# Patient Record
Sex: FEMALE | Race: WHITE | Employment: OTHER | URBAN - NONMETROPOLITAN AREA
[De-identification: names, ages, dates, MRNs, and addresses within clinical notes are randomized per-mention and may not be internally consistent; named-entity substitution may affect disease eponyms.]

---

## 2017-09-25 ENCOUNTER — HOSPITAL ENCOUNTER (OUTPATIENT)
Age: 57
Discharge: HOME | End: 2017-09-25
Payer: COMMERCIAL

## 2017-09-25 ENCOUNTER — HOSPITAL ENCOUNTER (OUTPATIENT)
Dept: WOMENS IMAGING | Age: 57
Discharge: HOME | End: 2017-09-25
Payer: COMMERCIAL

## 2017-09-25 ENCOUNTER — HOSPITAL ENCOUNTER (OUTPATIENT)
Dept: GENERAL RADIOLOGY | Age: 57
Discharge: HOME | End: 2017-09-25
Payer: COMMERCIAL

## 2017-09-25 DIAGNOSIS — Z12.31 VISIT FOR SCREENING MAMMOGRAM: ICD-10-CM

## 2017-09-25 DIAGNOSIS — M54.5 LOW BACK PAIN, UNSPECIFIED BACK PAIN LATERALITY, UNSPECIFIED CHRONICITY, WITH SCIATICA PRESENCE UNSPECIFIED: ICD-10-CM

## 2017-09-25 PROCEDURE — 77063 BREAST TOMOSYNTHESIS BI: CPT

## 2017-09-25 PROCEDURE — 72100 X-RAY EXAM L-S SPINE 2/3 VWS: CPT

## 2017-10-16 ENCOUNTER — HOSPITAL ENCOUNTER (OUTPATIENT)
Age: 57
Setting detail: OUTPATIENT SURGERY
Discharge: HOME HEALTH | End: 2017-10-16
Attending: INTERNAL MEDICINE | Admitting: INTERNAL MEDICINE
Payer: COMMERCIAL

## 2017-10-16 ENCOUNTER — ANESTHESIA (OUTPATIENT)
Dept: ENDOSCOPY | Age: 57
End: 2017-10-16
Payer: COMMERCIAL

## 2017-10-16 ENCOUNTER — ANESTHESIA EVENT (OUTPATIENT)
Dept: ENDOSCOPY | Age: 57
End: 2017-10-16
Payer: COMMERCIAL

## 2017-10-16 VITALS
OXYGEN SATURATION: 100 % | RESPIRATION RATE: 11 BRPM | SYSTOLIC BLOOD PRESSURE: 94 MMHG | DIASTOLIC BLOOD PRESSURE: 67 MMHG

## 2017-10-16 VITALS
HEIGHT: 61 IN | SYSTOLIC BLOOD PRESSURE: 113 MMHG | TEMPERATURE: 97 F | DIASTOLIC BLOOD PRESSURE: 77 MMHG | HEART RATE: 66 BPM | RESPIRATION RATE: 16 BRPM | WEIGHT: 117.2 LBS | BODY MASS INDEX: 22.13 KG/M2 | OXYGEN SATURATION: 100 %

## 2017-10-16 PROCEDURE — 3700000000 HC ANESTHESIA ATTENDED CARE: Performed by: INTERNAL MEDICINE

## 2017-10-16 PROCEDURE — 3609012400 HC EGD TRANSORAL BIOPSY SINGLE/MULTIPLE: Performed by: INTERNAL MEDICINE

## 2017-10-16 PROCEDURE — 6360000002 HC RX W HCPCS: Performed by: NURSE ANESTHETIST, CERTIFIED REGISTERED

## 2017-10-16 PROCEDURE — 7100000001 HC PACU RECOVERY - ADDTL 15 MIN: Performed by: INTERNAL MEDICINE

## 2017-10-16 PROCEDURE — 2500000003 HC RX 250 WO HCPCS: Performed by: NURSE ANESTHETIST, CERTIFIED REGISTERED

## 2017-10-16 PROCEDURE — 88305 TISSUE EXAM BY PATHOLOGIST: CPT

## 2017-10-16 PROCEDURE — 2580000003 HC RX 258: Performed by: INTERNAL MEDICINE

## 2017-10-16 PROCEDURE — 3700000001 HC ADD 15 MINUTES (ANESTHESIA): Performed by: INTERNAL MEDICINE

## 2017-10-16 PROCEDURE — 7100000000 HC PACU RECOVERY - FIRST 15 MIN: Performed by: INTERNAL MEDICINE

## 2017-10-16 PROCEDURE — 2580000003 HC RX 258: Performed by: NURSE ANESTHETIST, CERTIFIED REGISTERED

## 2017-10-16 PROCEDURE — 3609027000 HC COLONOSCOPY: Performed by: INTERNAL MEDICINE

## 2017-10-16 RX ORDER — PROPOFOL 10 MG/ML
INJECTION, EMULSION INTRAVENOUS PRN
Status: DISCONTINUED | OUTPATIENT
Start: 2017-10-16 | End: 2017-10-16 | Stop reason: SDUPTHER

## 2017-10-16 RX ORDER — LIDOCAINE HYDROCHLORIDE 20 MG/ML
INJECTION, SOLUTION INFILTRATION; PERINEURAL PRN
Status: DISCONTINUED | OUTPATIENT
Start: 2017-10-16 | End: 2017-10-16 | Stop reason: SDUPTHER

## 2017-10-16 RX ORDER — SODIUM CHLORIDE 9 MG/ML
INJECTION, SOLUTION INTRAVENOUS CONTINUOUS PRN
Status: DISCONTINUED | OUTPATIENT
Start: 2017-10-16 | End: 2017-10-16 | Stop reason: SDUPTHER

## 2017-10-16 RX ORDER — SODIUM CHLORIDE 450 MG/100ML
INJECTION, SOLUTION INTRAVENOUS CONTINUOUS
Status: DISCONTINUED | OUTPATIENT
Start: 2017-10-16 | End: 2017-10-16 | Stop reason: HOSPADM

## 2017-10-16 RX ORDER — ZOLPIDEM TARTRATE 10 MG/1
10 TABLET ORAL NIGHTLY PRN
COMMUNITY

## 2017-10-16 RX ADMIN — PROPOFOL 50 MG: 10 INJECTION, EMULSION INTRAVENOUS at 07:56

## 2017-10-16 RX ADMIN — SODIUM CHLORIDE: 4.5 INJECTION, SOLUTION INTRAVENOUS at 07:23

## 2017-10-16 RX ADMIN — LIDOCAINE HYDROCHLORIDE 100 MG: 20 INJECTION, SOLUTION INFILTRATION; PERINEURAL at 07:49

## 2017-10-16 RX ADMIN — PROPOFOL 20 MG: 10 INJECTION, EMULSION INTRAVENOUS at 08:15

## 2017-10-16 RX ADMIN — PROPOFOL 20 MG: 10 INJECTION, EMULSION INTRAVENOUS at 08:05

## 2017-10-16 RX ADMIN — PROPOFOL 50 MG: 10 INJECTION, EMULSION INTRAVENOUS at 07:59

## 2017-10-16 RX ADMIN — PROPOFOL 30 MG: 10 INJECTION, EMULSION INTRAVENOUS at 08:02

## 2017-10-16 RX ADMIN — SODIUM CHLORIDE: 9 INJECTION, SOLUTION INTRAVENOUS at 07:48

## 2017-10-16 RX ADMIN — PROPOFOL 80 MG: 10 INJECTION, EMULSION INTRAVENOUS at 07:49

## 2017-10-16 RX ADMIN — PROPOFOL 30 MG: 10 INJECTION, EMULSION INTRAVENOUS at 08:10

## 2017-10-16 RX ADMIN — PROPOFOL 20 MG: 10 INJECTION, EMULSION INTRAVENOUS at 07:53

## 2017-10-16 ASSESSMENT — PAIN SCALES - GENERAL
PAINLEVEL_OUTOF10: 0
PAINLEVEL_OUTOF10: 0

## 2017-10-16 ASSESSMENT — PAIN - FUNCTIONAL ASSESSMENT: PAIN_FUNCTIONAL_ASSESSMENT: 0-10

## 2017-10-16 NOTE — BRIEF OP NOTE
Brief Postoperative Note  ______________________________________________________________    Patient: Kathleen Johnson  YOB: 1960  MRN: [id number]  Date of Procedure: 10/16/2017    Pre-Op Diagnosis: GERD, DYSPHAGIA, DIARRHEA, FAMILY HX OF COLON CA    Post-Op Diagnosis: Same       Procedure(s):  COLONOSCOPY  EGD  EGD BIOPSY    Anesthesia: Monitor Anesthesia Care    Surgeon(s):  Rachel Johnson MD    Staff:  Scrub Person First: Lesa Sheth     Estimated Blood Loss: * No values recorded between 10/16/2017  7:24 AM and 10/16/2017  1:15 AM *    Complications: None    Specimens:   ID Type Source Tests Collected by Time Destination   A :  Tissue Stomach SURGICAL PATHOLOGY Rachel Johnson MD 10/16/2017 0754    B :  Tissue Esophagus SURGICAL PATHOLOGY Rachel Johnson MD 10/16/2017 0756        Implants:  * No implants in log *      Drains:      Findings: gastric polyp bx, colon normal    Rachel Johnson MD  Date: 10/16/2017  Time: 8:30 AM

## 2017-10-16 NOTE — H&P
[address] [place] [address]                      PREOPERATIVE HISTORY AND PHYSICAL    PATIENT NAME: Reza Fisher                     :             1960  MED REC NO:   [id number]                            ROOM:  ACCOUNT NO:   [de-identified]                            ADMISSION DATE:  PROVIDER:     Amita Landry M.D. INDICATION:  Chest pain. HISTORY OF PRESENT ILLNESS:  The patient is a 24-year-old pleasant  female for further evaluation of chest pain. EGD was in ,  reported as esophageal scarring status post dilation, had chest pain  that she noticed first thing in the morning. She has burning  sensation in the esophagus, taking omeprazole helped some, has  difficulty swallowing and tightness in the throat. Nothing makes it  better. No constipation. One bowel movement daily, but may have  diarrhea, no blood in the stool. Family history of colon cancer in  the father and she is undergoing further evaluation. PAST MEDICAL HISTORY:  Migraine headaches. PAST SURGICAL HISTORY:  Left breast lumpectomy,  section x3,  cosmetic surgery, endometrial ablation. MEDICATIONS:  Reviewed. PHYSICAL EXAMINATION:  VITALS:  Temperature 98.4, pulse 82, respiratory rate 16, blood  pressure 142/87. HEART:  Regular. Normal S1 and S2. No S3.  LUNGS:  Equal to expansion on inspection. ABDOMEN:  Soft, normoactive bowel sounds, nontender, nondistended. IMPRESSION:  A 24-year-old pleasant female who has epigastric and  substernal chest pain. The patient had diarrhea, family history of  colon cancer. She is undergoing further evaluation. RECOMMENDATIONS:  Keep the patient nothing by mouth. Proceed with EGD  and colonoscopy as planned.   The risks including but not limited to  perforation, bleeding, infection, adverse reaction to medicine, very  slight chance of missing significant lesions, discussed with the  patient. The patient expressed her understanding of the procedure and  agreed to proceed with EGD. Further plans pending the above test  results.         Jensen Duvall M.D.    D: 10/16/2017 7:48:02       T: 10/16/2017 8:50:45     [email or web address]  Job#: [id number]     Doc#: [id number]

## 2017-10-16 NOTE — ANESTHESIA POSTPROCEDURE EVALUATION
Department of Anesthesiology  Postprocedure Note    Patient: Torsten Segura  MRN: [id number]  YOB: 1960  Date of evaluation: 10/16/2017  Time:  8:22 AM     Procedure Summary     Date:  10/16/17 Room / Location:  [facility] / [facility]    Anesthesia Start:  0748 [facility]    Procedures:       COLONOSCOPY (N/A Anus)      EGD (N/A )      EGD BIOPSY (Left Esophagus) Diagnosis:  (GERD, DYSPHAGIA, DIARRHEA, FAMILY HX OF COLON CA)    Surgeon:  Bunny Shaikh MD Responsible Provider:  Christie Scott MD    Anesthesia Type:  MAC ASA Status:  2          Anesthesia Type: MAC    Corie Phase I: Corie Score: 10    Corie Phase II:      Last vitals: Reviewed and per EMR flowsheets.        Anesthesia Post Evaluation    Patient location during evaluation: PACU  Patient participation: complete - patient participated  Level of consciousness: awake and alert  Pain score: 0  Airway patency: patent  Nausea & Vomiting: no nausea and no vomiting  Complications: no  Cardiovascular status: blood pressure returned to baseline  Respiratory status: room air  Hydration status: euvolemic

## 2017-10-16 NOTE — OP NOTE
[address] [place] [address] Endoscopy    CONSCIOUS SEDATION      Patient: Jesus Macedo  : 1960  Acct#: [de-identified]    PLANNED PROCEDURE   [x]EGD  [x]Colonoscopy []Flex Sigmoid  []Other:  Indication: Pain control for GI procedure    Consent: I have discussed with the patient and/or the patient representative the indication, alternatives, and the possible risks and/or complications of the planned procedure and the anesthesia methods. The patient and/or patient representative appear to understand and agree to proceed. Pre-Sedation Documentation and Exam: I have personally completed a history, physical exam & review of systems for this patient (see notes). Airway Assessment: mac    Prior History of Anesthesia Complications: mac    ASA Classification: mac    Sedation/ Anesthesia Plan: mac      SEDATION  Planned agent:[x]Midazolam []Meperidine [x]Sublimaze []Morphine    Monitoring and Safety: The patient will be placed on a cardiac monitor and vital signs, pulse oximetry and level of consciousness will be continuously evaluated throughout the procedure. The patient will be closely monitored until recovery from the medications is complete and the patient has returned to baseline status. Respiratory therapy will be on standby during the procedure. I have reviewed with the patient and/or family the risks, benefits, and alternatives to the procedure.     Zoila Meeks MD, CNSP  10/16/2017, 7:47 AM    Post-Sedation Vital Signs: Vital signs were reviewed and were stable after the procedure (see flow sheet for vitals)            Post-Sedation Exam: Lungs: clear           Complications: none     Zoila Meeks MD, CNSP  10/16/2017,
noted.   Multiple biopsies obtained from the mid and proximal esophagus to rule  out eosinophilic esophagitis. Colonoscopy to distal ileum, entire  colon was normal.    RECOMMENDATIONS:  Antireflux instructions, high-fiber diet, fiber  supplements. Call my office in less than 2 weeks for the biopsy  results. Follow up with me in 4 to 5 weeks to collect the biopsy  results. Followup colonoscopy in 5 years. Thank you Dr. Olivia Maldonado for letting me to do procedure on this patient. Do not hesitate to call me if you have any questions. My  recommendations are above. Paulette Tolentino M.D.    D: 10/16/2017 8:37:48       T: 10/16/2017 9:29:42     [email or web address]  Job#: [id number]     Doc#: [id number]    cc:   Burney Lanes, M.D.

## 2021-03-18 ENCOUNTER — HOSPITAL ENCOUNTER (OUTPATIENT)
Dept: WOMENS IMAGING | Age: 61
Discharge: HOME | End: 2021-03-18
Payer: COMMERCIAL

## 2021-03-18 DIAGNOSIS — Z12.31 VISIT FOR SCREENING MAMMOGRAM: ICD-10-CM

## 2021-03-18 PROCEDURE — 77063 BREAST TOMOSYNTHESIS BI: CPT

## 2021-03-22 ENCOUNTER — HOSPITAL ENCOUNTER (OUTPATIENT)
Dept: WOMENS IMAGING | Age: 61
Discharge: HOME | End: 2021-03-22
Payer: COMMERCIAL

## 2021-03-22 DIAGNOSIS — R92.1 BREAST CALCIFICATION, LEFT: ICD-10-CM

## 2021-03-22 PROCEDURE — G0279 TOMOSYNTHESIS, MAMMO: HCPCS

## 2021-03-26 ENCOUNTER — HOSPITAL ENCOUNTER (OUTPATIENT)
Dept: WOMENS IMAGING | Age: 61
Discharge: HOME | End: 2021-03-26
Payer: COMMERCIAL

## 2021-03-26 DIAGNOSIS — R92.1 BREAST CALCIFICATIONS: ICD-10-CM

## 2021-03-26 DIAGNOSIS — R92.1 BREAST CALCIFICATION, LEFT: ICD-10-CM

## 2021-03-26 PROCEDURE — 2709999900 MAM STEREO BREAST BX W LOC DEVICE 1ST LESION LEFT

## 2021-03-26 PROCEDURE — 77065 DX MAMMO INCL CAD UNI: CPT

## 2021-03-26 PROCEDURE — 88305 TISSUE EXAM BY PATHOLOGIST: CPT

## 2021-03-26 NOTE — PROGRESS NOTES
Formulation and discussion of sedation / procedure plans, risks, benefits, side effects and alternatives with patient and/or responsible adult completed.     Electronically signed by Loni Fortune MD on 3/26/2021 at 9:30 AM

## 2021-03-26 NOTE — PROGRESS NOTES
[facility] [address]  Pre-Biopsy Assessment      Patient Education    Written information about procedure Yes  left   Procedural steps explained Yes Stereotactic Biopsy   Post-op potential: bruising, hematoma, pain Yes    Self-care: activity, care of dressing Yes    Patient verbalized understanding Yes    Consent signed and witnessed Yes      Hormone Therapy Status: none    Recent Medication: N/A Last Dose: none                                     Hormone Replacement Therapy: no    Previous Breast Biopsy: yes - 2004 left stereo, benign ,  2012 Left US biopsy, papilloma excised    Previous Diagnosis Cancer: no    Hysterectomy:no    Emotional Status: Calm    Language or Physical Barriers: none    Comments: none      Electronically signed by La Nena Enrique on 3/26/2021 at 10:43 AM

## 2021-03-26 NOTE — PROGRESS NOTES
Breast Biopsy Flowsheet/Post-Operative Care    Date of Procedure: 3/26/2021  Physician: Dr. Yousif Cobb  Technologist: Denia Roa RT(R)(M)    Biopsy:stereotactic breast biopsy  Lesion type: Non-palpable  Breast: left    Clock face position: Site #1: LOQ posterior depth        Primary Method of Detection: Mammogram      Microcalcification's: yes,Pleomorphic   Distribution: Grouped    Asymmetry: asymmetric    Biopsy Method:   Mammotome:    Site # 1    Gauge: 10    # of Passes: 6     Clip: Coil       Pre-Op Assessment: (BI-RADS)   4. Suspicious Abnormality    Patient Tolerated Procedure: good  Complications: none  Comments: none    Post Operative Care  Steri strips: Yes  Dressing: Gauze, Tape   Ice Applied to Site:  Yes  Evidence of Bleeding:  No    Pain Verbalized: No      Written Discharge Instructions: Yes  Condition at Discharge: good  Time of Discharge: [address]    Electronically signed by Jeremy Valiente on 3/26/2021 at 10:45 AM

## 2021-03-29 ENCOUNTER — CLINICAL DOCUMENTATION (OUTPATIENT)
Dept: WOMENS IMAGING | Age: 61
End: 2021-03-29

## 2021-03-29 NOTE — PROGRESS NOTES
Contact Type: [facility]    Contact Information: Arrived alone for biopsy results. Dr. Mary Salamanca discussed pathology and recommends excisional biopsy. All cards given for surgeons. Diagnosis: Intraductal papilloma     Requests Referral to Doctor(s) with appointment(s): Dr. Drew Found    Additional Referral(s): Shabbir Denton/Maya Graham: [facility]igators for high risk status    Biopsy site status: doing fine    Updated Tyrer-Cuzick Score, if applicable: n/a    Teaching sheets provided: Excisional biopsy, Needle localization, Papilloma    Notes: Explained terms doctor and navigators will discuss at next appointments. Questions addressed. Referral faxed to Navigation due to high risk status.      Answered yes to any section on Hereditary Risk Assessment: no    Additional information: pt is a former [employer], was a [employer] before she was transferred to [employer], retired in Nov. 2020    Results faxed to:  Dr. Rad Marino

## 2021-04-01 ENCOUNTER — OFFICE VISIT (OUTPATIENT)
Dept: SURGERY | Age: 61
End: 2021-04-01
Payer: COMMERCIAL

## 2021-04-01 VITALS
DIASTOLIC BLOOD PRESSURE: 60 MMHG | BODY MASS INDEX: 22.98 KG/M2 | HEART RATE: 96 BPM | RESPIRATION RATE: 18 BRPM | TEMPERATURE: 96.4 F | SYSTOLIC BLOOD PRESSURE: 120 MMHG | OXYGEN SATURATION: 99 % | HEIGHT: 61 IN | WEIGHT: 121.7 LBS

## 2021-04-01 DIAGNOSIS — D24.2 INTRADUCTAL PAPILLOMA OF LEFT BREAST: Primary | ICD-10-CM

## 2021-04-01 DIAGNOSIS — R92.0 MICROCALCIFICATION OF LEFT BREAST ON MAMMOGRAM: ICD-10-CM

## 2021-04-01 PROCEDURE — G8420 CALC BMI NORM PARAMETERS: HCPCS | Performed by: SURGERY

## 2021-04-01 PROCEDURE — 1036F TOBACCO NON-USER: CPT | Performed by: SURGERY

## 2021-04-01 PROCEDURE — 3017F COLORECTAL CA SCREEN DOC REV: CPT | Performed by: SURGERY

## 2021-04-01 PROCEDURE — G8427 DOCREV CUR MEDS BY ELIG CLIN: HCPCS | Performed by: SURGERY

## 2021-04-01 PROCEDURE — 99203 OFFICE O/P NEW LOW 30 MIN: CPT | Performed by: SURGERY

## 2021-04-01 RX ORDER — LEVOTHYROXINE SODIUM 0.07 MG/1
TABLET ORAL
COMMUNITY
Start: 2021-03-02

## 2021-04-01 SDOH — ECONOMIC STABILITY: FOOD INSECURITY: WITHIN THE PAST 12 MONTHS, THE FOOD YOU BOUGHT JUST DIDN'T LAST AND YOU DIDN'T HAVE MONEY TO GET MORE.: NOT ASKED

## 2021-04-01 SDOH — ECONOMIC STABILITY: TRANSPORTATION INSECURITY
IN THE PAST 12 MONTHS, HAS LACK OF TRANSPORTATION KEPT YOU FROM MEETINGS, WORK, OR FROM GETTING THINGS NEEDED FOR DAILY LIVING?: NOT ASKED

## 2021-04-01 SDOH — ECONOMIC STABILITY: FOOD INSECURITY: WITHIN THE PAST 12 MONTHS, YOU WORRIED THAT YOUR FOOD WOULD RUN OUT BEFORE YOU GOT MONEY TO BUY MORE.: NOT ASKED

## 2021-04-01 ASSESSMENT — ENCOUNTER SYMPTOMS
VOMITING: 0
BLOOD IN STOOL: 0
NAUSEA: 0
COUGH: 0
WHEEZING: 0
SORE THROAT: 0
ABDOMINAL PAIN: 0
COLOR CHANGE: 0
SHORTNESS OF BREATH: 0

## 2021-04-01 NOTE — PROGRESS NOTES
Roseline Cruz MD   General Surgery  New Patient Evaluation in Office  Pt Name: Socorro English  Date of Birth 1960   Today's Date: 4/1/2021  Medical Record Number: [id number]  Referring Provider: Reed Epley, MD  Primary Care Provider: Yeimy Rosario MD  Chief Complaint:  Chief Complaint   Patient presents with    Surgical Consult     New patient ref [facility] left breast papilloma       ASSESSMENT      1. Intraductal papilloma of left breast    2. Microcalcification of left breast on mammogram    Subdental on biopsy for microcalcifications  2. benign microcalcifications left breast  3. Imaging concordant with pathology. PLANS      1. Patient examined benign clinical breast examination. 2.  Discussed with patient options. Very low risk of upgrade on lumpectomy. Incidental 2 mm lipoma. Imaging no mass. Biopsy performed for calcifications. Biopsies negative. Patient could opt for lumpectomy with very low risk of upgrade versus observation and follow-up imaging in 6 months. Patient opting for 6-month interval follow-up imaging. She was counseled and aware if increased calcifications she may warrant repeat biopsy and/or lumpectomy. 3.  All imaging reviewed. 4.  Follow-up surgical clinic in 4 months for clinical breast examination and scheduling of follow-up diagnostic imaging of the left breast        Ramya Yanez is a 56y.o. year old female who is presenting today in the office for surgical consultation regarding recent core needle biopsy of the left breast for microcalcifications and incidental finding of small intraductal papilloma on pathology. Leonor Conklin had 1 previous left breast biopsy in 2012 by Dr. Umm Maldonado. She had a lumpectomy for a left breast mass. Pathology revealed proliferative changes ductal papillomatosis adenosis and usual ductal hyperplasia with no atypia. She denies any breast symptoms. She has not had a mammogram here at [place] [address] since 2017.   On screening mammography in March she had a 5 mm cluster of fine microcalcifications in the lower outer aspect of the left breast posterior depth 8 cm from the nipple. She underwent diagnostic mammography which revealed a 7 mm cluster of fine microcalcifications at that area there were no masses seen. She underwent a stereotactic core biopsy. Pathology revealed an incidental 2 mm intraductal papilloma fibrocystic changes usual ductal hyperplasia and microcalcifications. There was no evidence of malignancy. Menarche age 13. G8, P8. First child at age 19 last at age 43. She took oral contraceptive medications off and on. She breast-fed her children. She had an endometrial ablation in her early 45s. She thinks she went through menopause in her later 45s. Denies a family history of breast or ovarian cancer. Father history of colon cancer.     Past Medical History  Past Medical History:   Diagnosis Date    Anxiety     GERD (gastroesophageal reflux disease)     Hyperlipidemia     Hypothyroid     Intraductal papillomatosis     Migraines     Papilloma 2012    left breast       Past Surgical History  Past Surgical History:   Procedure Laterality Date    BREAST BIOPSY Left 2004    papilla    BREAST BIOPSY Left 2012    BREAST SURGERY  2012    left breast lumpectomy-Dr. Sonya Wang     SECTION      x4    COSMETIC SURGERY  1988    nose and left cheek-broke    ENDOMETRIAL ABLATION  2002    ART STEROTACTIC LOC BREAST BIOPSY LEFT Left 2021    ART STEROTACTIC LOC BREAST BIOPSY LEFT 3/26/2021 [facility] Quiroga [facility]    GA COLON CA [address]CRN NOT  [address]  IND N/A 10/16/2017    COLONOSCOPY performed by Susanna Clemons MD at  [facility]    GA EGD TRANSORAL BIOPSY SINGLE/MULTIPLE Left 10/16/2017    EGD BIOPSY performed by Susanna Clemons MD at  [facility]       Medications  Current Outpatient Medications   Medication Sig Dispense Refill    levothyroxine (SYNTHROID) 75 MCG tablet take 1 tablet by mouth once daily      zolpidem (AMBIEN) 10 MG tablet Take 10 mg by mouth nightly as needed for Sleep      citalopram (CELEXA) 20 MG tablet       omeprazole (PRILOSEC) 20 MG capsule Take 20 mg by mouth 2 times daily       topiramate (TOPAMAX) 50 MG tablet Take 150 mg by mouth daily       sumatriptan (IMITREX) 100 MG tablet Take 100 mg by mouth once as needed        No current facility-administered medications for this visit.       Allergies   No Known Allergies    Family History  Family History   Problem Relation Age of Onset    No Known Problems Mother     Heart Disease Father     Colon Cancer Father         [de-identified]    No Known Problems Sister     No Known Problems Brother     No Known Problems Maternal Grandmother     No Known Problems Maternal Grandfather     No Known Problems Paternal Grandmother     No Known Problems Paternal Grandfather     No Known Problems Brother     No Known Problems Sister     No Known Problems Sister     No Known Problems Sister        SocialHistory  Social History     Socioeconomic History    Marital status:      Spouse name: Not on file    Number of children: 3    Years of education: Not on file    Highest education level: Not on file   Occupational History    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   Social Needs    Financial resource strain: Not on file    Food insecurity     Worry: Not on file     Inability: Not on file   [employer] needs     Medical: Not on file     Non-medical: Not on file   Tobacco Use    Smoking status: Never Smoker    Smokeless tobacco: Never Used   Substance and Sexual Activity    Alcohol use: No    Drug use: No    Sexual activity: Not on file   Lifestyle    Physical activity     Days per week: Not on file     Minutes per session: Not on file    Stress: Not on file   Relationships    Social connections     Talks on phone: Not on file     Gets together: Not on file     Attends Shinto service: Not on file     Active member of club or organization: Not on file     Attends meetings of clubs or organizations: Not on file     Relationship status: Not on file    Intimate partner violence     Fear of current or ex partner: Not on file     Emotionally abused: Not on file     Physically abused: Not on file     Forced sexual activity: Not on file   Other Topics Concern    Not on file   Social History Narrative    Not on file           Review of Systems  Review of Systems   Constitutional: Negative for chills, fatigue, fever and unexpected weight change. HENT: Negative for congestion and sore throat. Eyes: Negative for visual disturbance. Respiratory: Negative for cough, shortness of breath and wheezing. Cardiovascular: Negative for chest pain and palpitations. Gastrointestinal: Negative for abdominal pain, blood in stool, nausea and vomiting. Endocrine: Negative for cold intolerance, heat intolerance and polydipsia. Genitourinary: Negative for dysuria, flank pain and hematuria. Musculoskeletal: Negative for arthralgias, gait problem, joint swelling and myalgias. Skin: Negative for color change and rash. Allergic/Immunologic: Negative for immunocompromised state. Neurological: Negative for dizziness, tremors, seizures and speech difficulty. Hematological: Negative for adenopathy. Does not bruise/bleed easily. Psychiatric/Behavioral: Negative for behavioral problems and confusion. OBJECTIVE     /60 (Site: Right Upper Arm, Position: Sitting, Cuff Size: Medium Adult)   Pulse 96   Temp 96.4 °F (35.8 °C) (Temporal)   Resp 18   Ht 5' 1\" (1.549 m)   Wt 121 lb 11.2 oz (55.2 kg)   SpO2 99%   Breastfeeding No   BMI 23.00 kg/m²      Physical Exam  Constitutional:       Appearance: Normal appearance. She is well-developed. She is not ill-appearing or diaphoretic. HENT:      Head: Normocephalic and atraumatic. Nose: No congestion. Eyes:      General: No scleral icterus. Extraocular Movements: Extraocular movements intact. Pupils: Pupils are equal, round, and reactive to light. Neck:      Musculoskeletal: No muscular tenderness. Vascular: No JVD. Trachea: No tracheal deviation. Cardiovascular:      Rate and Rhythm: Normal rate and regular rhythm. Heart sounds: Normal heart sounds. No murmur. Pulmonary:      Effort: Pulmonary effort is normal. No respiratory distress. Breath sounds: No wheezing or rales. Chest:      Chest wall: No tenderness. Breasts:         Right: No inverted nipple, mass, nipple discharge, skin change or tenderness. Left: No inverted nipple, mass, nipple discharge, skin change or tenderness. Abdominal:      General: There is no distension. Palpations: There is no mass. Tenderness: There is no abdominal tenderness. Musculoskeletal:         General: No deformity. Lymphadenopathy:      Cervical: No cervical adenopathy. Right cervical: No superficial, deep or posterior cervical adenopathy. Left cervical: No superficial, deep or posterior cervical adenopathy. Upper Body:      Right upper body: No supraclavicular, axillary or pectoral adenopathy. Left upper body: No supraclavicular, axillary or pectoral adenopathy. Skin:     General: Skin is warm and dry. Findings: No rash. Neurological:      General: No focal deficit present. Mental Status: She is alert and oriented to person, place, and time. Cranial Nerves: No cranial nerve deficit. Psychiatric:         Mood and Affect: Mood normal.         Behavior: Behavior normal.         Thought Content:  Thought content normal.         Lab Results   Component Value Date    HCT 38.7 04/26/2012       Performed by: [id number]     [place], [place]                  [id number]   Assoc[facility]                                              Page 1 sl 4 [phone]   [place], [place] [address]                                                       PROC: 2021   [facility]                                    RECV: 2021   [address]                                    RPTD: 2021   [address], [place]                        MRN:  [id number]     LOC: [facility]                       ACCT: [de-identified]  SEX: F                       : 1960  AGE: 60 Y                          PATHOLOGY REPORT                       ATTN: TREVOR FAUST                       REQ: Delmi Avelar       Copies To:   KELLY HWANG       Clinical Information: LEFT BREAST CALCIFICATIONS     FINAL DIAGNOSIS:   A and B.  Breast, left, lower outer quadrant, post, coil clip, needle   core biopsies:       Intraductal papilloma, 2 mm.       Fibrocystic changes including apocrine metaplasia, usual ductal       hyperplasia, and microcyst formation.       Microcalcifications.   Christie Sanchez for malignancy. Specimen:   A) CORE NEEDLE BREAST BIOPSY, LEFT, LLOQ POST, COIL CLIP-1A   B) CORE NEEDLE BREAST BIOPSY, LEFT, LLOQ POST, COIL CLIP-1B       Gross Examination:   A - The container is labeled Thegideon Fuller, left breast, A, lower outer   quadrant, posterior, coil.  Received in formalin are fragments of   yellow-white fibroadipose tissue aggregating to 2 x 1.7 x 0.2 cm.  1   ns. Fixative:  10% neutral buffered formalin   Tissue removal time:  10:09   Radiology time:  10:10   Tissue fixation time:  10:10   Total fixation time:  9 hours and 50 minutes     B - The container is labeled Thegideon Cherytise, left breast, B, lower outer   quadrant, posterior, coil.  Received in formalin are fragments of   yellow adipose tissue aggregating to 2 x 1.8 x 0.2 cm.  1 ns. SMW/DKR:[id number]     Fixative:  10% neutral buffered formalin   Tissue removal time:  10:09   Radiology time:  10:10   Tissue fixation time:  10:10   Total fixation time:  9 hours and 50 minutes     Microscopic Examination:   A and B.  The specimen consists of multiple fragments of breast tissue. for additional imaging.       The patient will be contacted by our department for additional imaging/scheduling.               **This report has been created using voice recognition software. It may contain minor errors which are inherent in voice recognition technology. **       Final report electronically signed by Dr. Radha Blackburn on 3/19/2021 8:27 A     [facility] JÚNIOR DIGITAL DIAGNOSTIC UNILATERAL LEFT (Order [id number]) - Reflex for Order [id number]  Narrative   LOCATION: [place]       PROCEDURE: [facility] JÚNIOR DIGITAL DIAGNOSTIC UNILATERAL LEFT       CLINICAL INFORMATION: Breast calcification, left . Tomosynthesis. Left breast calcifications. .       Tyrer-Cuzick calculation reports this patient's 10 year risk for developing breast cancer is 3.0% and lifetime risk is 7.0%.        COMPARISON: 3/18/2021, 9/25/2017.       TECHNIQUE: Images of the left include an LM view and magnification CC, MLO and LM views. . Tomosynthesis and CAD were utilized.        BREAST COMPOSITION: The tissue of the breast(s) is heterogeneously dense. This may lower the sensitivity of mammography.       FINDINGS:        There is a 7 mm cluster of fine microcalcifications in the left outer lower breast, posterior depth seen only on the CC view. These are 8 cm posterior to the nipple. These are confirmed on additional CC images. These cannot be included in the    field-of-view on the LM or MLO views. Based on the tomographic images these are inferior. No other significant masses, calcifications, or other findings are seen in the breast(s).             Impression   Cluster of fine microcalcifications in the lower outer left breast. These are indeterminate. A stereotactic guided biopsy is recommended.       These results were discussed with the patient. The tech navigator was notified.  We will arrange scheduling the patient for her procedure per department protocol.                   BI-RADS CATEGORY 4B - Intermediate suspicion for malignancy.     Management: Tissue diagnosis.  Biopsy should be performed in the absence of clinical contraindication.               **This report has been created using voice recognition software. It may contain minor errors which are inherent in voice recognition technology. **       Final report electronically signed by Dr. Krista Swanson on 3/22/2021 3:43 PM

## 2021-04-01 NOTE — LETTER
[address] [facility]  [place] [address] [address] [facility] [address]  Phone: [phone]  Fax: [phone]    Ricco Fair MD        April 1, 2021    Patient: Breanna Albarado   MR Number: [id number]   YOB: 1960   Date of Visit: 4/1/2021     Dear Dr. Phu Monique,    Thank you for the request for consultation for Breanna Albarado to me for the evaluation of left breast microcalcifications associated small intraductal papilloma. Below are the relevant portions of my assessment and plan of care. 1. Intraductal papilloma of left breast    2. Microcalcification of left breast on mammogram    Subdental on biopsy for microcalcifications  2. benign microcalcifications left breast  3. Imaging concordant with pathology. 1.  Patient examined benign clinical breast examination. 2.  Discussed with patient options. Very low risk of upgrade on lumpectomy. Incidental 2 mm lipoma. Imaging no mass. Biopsy performed for calcifications. Biopsies negative. Patient could opt for lumpectomy with very low risk of upgrade versus observation and follow-up imaging in 6 months. Patient opting for 6-month interval follow-up imaging. She was counseled and aware if increased calcifications she may warrant repeat biopsy and/or lumpectomy. 3.  All imaging reviewed. 4.  Follow-up surgical clinic in 4 months for clinical breast examination and scheduling of follow-up diagnostic imaging of the left breast        If you have questions, please do not hesitate to call me. I look forward to following Brittnee Baldwin along with you.     Sincerely,          Ricco Fair MD

## 2021-08-12 ENCOUNTER — OFFICE VISIT (OUTPATIENT)
Dept: SURGERY | Age: 61
End: 2021-08-12
Payer: COMMERCIAL

## 2021-08-12 VITALS
SYSTOLIC BLOOD PRESSURE: 102 MMHG | TEMPERATURE: 97.3 F | OXYGEN SATURATION: 98 % | WEIGHT: 116 LBS | RESPIRATION RATE: 18 BRPM | DIASTOLIC BLOOD PRESSURE: 70 MMHG | HEART RATE: 76 BPM | BODY MASS INDEX: 21.92 KG/M2

## 2021-08-12 DIAGNOSIS — R92.0 MICROCALCIFICATION OF LEFT BREAST ON MAMMOGRAM: Primary | ICD-10-CM

## 2021-08-12 DIAGNOSIS — D24.2 INTRADUCTAL PAPILLOMA OF LEFT BREAST: ICD-10-CM

## 2021-08-12 PROCEDURE — 1036F TOBACCO NON-USER: CPT | Performed by: SURGERY

## 2021-08-12 PROCEDURE — 3017F COLORECTAL CA SCREEN DOC REV: CPT | Performed by: SURGERY

## 2021-08-12 PROCEDURE — G8427 DOCREV CUR MEDS BY ELIG CLIN: HCPCS | Performed by: SURGERY

## 2021-08-12 PROCEDURE — G8420 CALC BMI NORM PARAMETERS: HCPCS | Performed by: SURGERY

## 2021-08-12 PROCEDURE — 99213 OFFICE O/P EST LOW 20 MIN: CPT | Performed by: SURGERY

## 2021-08-12 RX ORDER — ROSUVASTATIN CALCIUM 10 MG/1
1 TABLET, COATED ORAL NIGHTLY
COMMUNITY
Start: 2021-07-17

## 2021-08-12 ASSESSMENT — ENCOUNTER SYMPTOMS
SHORTNESS OF BREATH: 0
COUGH: 0
WHEEZING: 0
NAUSEA: 0
COLOR CHANGE: 0
SORE THROAT: 0
ABDOMINAL PAIN: 0
VOMITING: 0
BLOOD IN STOOL: 0

## 2021-08-12 NOTE — PROGRESS NOTES
Kae Alford MD   General Surgery  New Patient Evaluation in Office  Pt Name: Marshal Mota  Date of Birth 1960   Today's Date: 8/12/2021  Medical Record Number: [id number]  Referring Provider: No ref. provider found  Primary Care Provider: Johanna Moreland MD  Chief Complaint:  Chief Complaint   Patient presents with    Follow-up     4 month f/u--Intraductal papilloma of left breast-6 month f/u imaging due in October       ASSESSMENT      1. Microcalcification of left breast on mammogram    2. Intraductal papilloma of left breast     incidental on biopsy for microcalcifications  2. benign microcalcifications left breast  3. Imaging concordant with pathology. PLANS      1. Patient examined benign clinical breast examination. 2.  Discussed with patient options. Very low risk of upgrade on lumpectomy. Incidental 2 mm lipoma. Imaging no mass. Biopsy performed for calcifications. Biopsies negative. Patient could opt for lumpectomy with very low risk of upgrade versus observation and follow-up imaging in 6 months. Patient opting for 6-month interval follow-up imaging. She was counseled and aware if increased calcifications she may warrant repeat biopsy and/or lumpectomy. 3.  Schedule follow-up imaging. Review when available. If benign imaging can follow-up with your gynecologist on a yearly basis and continue yearly follow-up screening imaging. 4.  Clinical exam benign      SUBJECTIVE   History of present illness:  Baudilio Roland is a 59y.o. year old female who is presenting today in the office for surgical consultation regarding recent core needle biopsy of the left breast for microcalcifications and incidental finding of small intraductal papilloma on pathology. Baudilio Roland had 1 previous left breast biopsy in 2012 by Dr. Kati Fairbanks. She had a lumpectomy for a left breast mass. Pathology revealed proliferative changes ductal papillomatosis adenosis and usual ductal hyperplasia with no atypia.   She denies any breast symptoms. She has not had a mammogram here at [place] [address] since 2017. On screening mammography in March she had a 5 mm cluster of fine microcalcifications in the lower outer aspect of the left breast posterior depth 8 cm from the nipple. She underwent diagnostic mammography which revealed a 7 mm cluster of fine microcalcifications at that area there were no masses seen. She underwent a stereotactic core biopsy. Pathology revealed an incidental 2 mm intraductal papilloma fibrocystic changes usual ductal hyperplasia and microcalcifications. There was no evidence of malignancy. Menarche age 13. G8, P8. First child at age 19 last at age 43. She took oral contraceptive medications off and on. She breast-fed her children. She had an endometrial ablation in her early 45s. She thinks she went through menopause in her later 45s. Denies a family history of breast or ovarian cancer. Father history of colon cancer.     Past Medical History  Past Medical History:   Diagnosis Date    Anxiety     GERD (gastroesophageal reflux disease)     Hyperlipidemia     Hypothyroid     Intraductal papillomatosis     Migraines     Papilloma     left breast       Past Surgical History  Past Surgical History:   Procedure Laterality Date    BREAST BIOPSY Left 2004    papilla    BREAST BIOPSY Left 2012    BREAST SURGERY  2012    left breast lumpectomy-Dr. Kris Mendez     SECTION      x4    COSMETIC SURGERY  1988    nose and left cheek-broke    ENDOMETRIAL ABLATION      ART STEROTACTIC LOC BREAST BIOPSY LEFT Left 2021    ART STEROTACTIC LOC BREAST BIOPSY LEFT 3/26/2021 [facility]    ID COLON CA SCRN NOT  [address] IND N/A 10/16/2017    COLONOSCOPY performed by Dawn Adam [facility] at [facility]    ID EGD TRANSORAL BIOPSY SINGLE/MULTIPLE Left 10/16/2017    EGD BIOPSY performed by Dawn Adam MD at [facility] Medications  Current Outpatient Medications   Medication Sig Dispense Refill    rosuvastatin (CRESTOR) 10 MG tablet Take 1 tablet by mouth nightly      levothyroxine (SYNTHROID) 75 MCG tablet take 1 tablet by mouth once daily      zolpidem (AMBIEN) 10 MG tablet Take 10 mg by mouth nightly as needed for Sleep      citalopram (CELEXA) 20 MG tablet       omeprazole (PRILOSEC) 20 MG capsule Take 20 mg by mouth 2 times daily       topiramate (TOPAMAX) 50 MG tablet Take 150 mg by mouth daily       sumatriptan (IMITREX) 100 MG tablet Take 100 mg by mouth once as needed        No current facility-administered medications for this visit.      Allergies   No Known Allergies    Family History  Family History   Problem Relation Age of Onset    No Known Problems Mother     Heart Disease Father     Colon Cancer Father         [de-identified]    No Known Problems Sister     No Known Problems Brother     No Known Problems Maternal Grandmother     No Known Problems Maternal Grandfather     No Known Problems Paternal Grandmother     No Known Problems Paternal Grandfather     No Known Problems Brother     No Known Problems Sister     No Known Problems Sister     No Known Problems Sister        SocialHistory  Social History     Socioeconomic History    Marital status:      Spouse name: Not on file    Number of children: 3    Years of education: Not on file    Highest education level: Not on file   Occupational History    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   Tobacco Use    Smoking status: Never Smoker    Smokeless tobacco: Never Used   Substance and Sexual Activity    Alcohol use: No    Drug use: No    Sexual activity: Not on file   Other Topics Concern    Not on file   Social History Narrative    Not on file     Social Determinants of Health     Financial Resource Strain:     Difficulty of Paying Living Expenses:    Food Insecurity:     Worried About Running Out of Food in the Last Year:     [employer] in the Last Year:    Transportation Needs:     Lack of Transportation (Medical):  Lack of Transportation (Non-Medical):    Physical Activity:     Days of Exercise per Week:     Minutes of Exercise per Session:    Stress:     Feeling of Stress :    Social Connections:     Frequency of Communication with Friends and Family:     Frequency of Social Gatherings with Friends and Family:     Attends Jewish Services:     Active Member of Clubs or Organizations:     Attends Club or Organization Meetings:     Marital Status:    Intimate Partner Violence:     Fear of Current or Ex-Partner:     Emotionally Abused:     Physically Abused:     Sexually Abused:            Review of Systems  Review of Systems   Constitutional: Negative for chills, fatigue, fever and unexpected weight change. HENT: Negative for congestion and sore throat. Eyes: Negative for visual disturbance. Respiratory: Negative for cough, shortness of breath and wheezing. Cardiovascular: Negative for chest pain and palpitations. Gastrointestinal: Negative for abdominal pain, blood in stool, nausea and vomiting. Endocrine: Negative for cold intolerance and heat intolerance. Genitourinary: Negative for dysuria, flank pain and hematuria. Musculoskeletal: Negative for arthralgias, joint swelling and myalgias. Skin: Negative for color change and rash. Allergic/Immunologic: Negative for immunocompromised state. Neurological: Negative for dizziness, tremors, seizures and speech difficulty. Hematological: Negative for adenopathy. Does not bruise/bleed easily. Psychiatric/Behavioral: Negative for behavioral problems and confusion. OBJECTIVE     /70 (Site: Right Upper Arm, Position: Sitting, Cuff Size: Medium Adult)   Pulse 76   Temp 97.3 °F (36.3 °C) (Infrared)   Resp 18   Wt 116 lb (52.6 kg)   SpO2 98%   BMI 21.92 kg/m²      Physical Exam  Constitutional:       Appearance: Normal appearance.  She is well-developed. She is not ill-appearing or diaphoretic. HENT:      Head: Normocephalic and atraumatic. Nose: No congestion. Eyes:      General: No scleral icterus. Extraocular Movements: Extraocular movements intact. Pupils: Pupils are equal, round, and reactive to light. Neck:      Vascular: No JVD. Trachea: No tracheal deviation. Cardiovascular:      Rate and Rhythm: Normal rate and regular rhythm. Pulmonary:      Effort: Pulmonary effort is normal. No respiratory distress. Breath sounds: No wheezing. Chest:      Chest wall: No tenderness. Breasts:         Right: No inverted nipple, mass, nipple discharge, skin change or tenderness. Left: No inverted nipple, mass, nipple discharge, skin change or tenderness. Abdominal:      General: There is no distension. Palpations: There is no mass. Tenderness: There is no abdominal tenderness. Musculoskeletal:         General: No deformity. Cervical back: No muscular tenderness. Lymphadenopathy:      Cervical: No cervical adenopathy. Right cervical: No superficial, deep or posterior cervical adenopathy. Left cervical: No superficial, deep or posterior cervical adenopathy. Upper Body:      Right upper body: No supraclavicular, axillary or pectoral adenopathy. Left upper body: No supraclavicular, axillary or pectoral adenopathy. Skin:     General: Skin is warm and dry. Coloration: Skin is not jaundiced or pale. Findings: No rash. Neurological:      General: No focal deficit present. Mental Status: She is alert and oriented to person, place, and time. Cranial Nerves: No cranial nerve deficit. Psychiatric:         Mood and Affect: Mood normal.         Behavior: Behavior normal.         Thought Content:  Thought content normal.         Lab Results   Component Value Date    HCT 38.7 04/26/2012       Performed by: [facility]. Pathology     Huong Needle                  [id number]   Assoc.                                              Page 1 of 5 [address]   [facility] [address], [place] [address]                                                       PROC: 2021   [facility]                                    RECV: 2021   [address]                                    RPTD: 2021   [facility] [address], [place] [address]                       MRN:  [id number]     LOC:                        ACCT: [de-identified]  SEX: F                       : 1960  AGE: 56 Y                          PATHOLOGY REPORT                       ATTN: TREVOR FAUST                       REQ: Jakob Dutton       Copies To:   Netli Latisha       Clinical Information: LEFT BREAST CALCIFICATIONS     FINAL DIAGNOSIS:   A and B.  Breast, left, lower outer quadrant, post, coil clip, needle   core biopsies:       Intraductal papilloma, 2 mm.       Fibrocystic changes including apocrine metaplasia, usual ductal       hyperplasia, and microcyst formation.       Microcalcifications.   Maty Mitten for malignancy. Specimen:   A) CORE NEEDLE BREAST BIOPSY, LEFT, LLOQ POST, COIL CLIP-1A   B) CORE NEEDLE BREAST BIOPSY, LEFT, LLOQ POST, COIL CLIP-1B       Gross Examination:   A - The container is labeled Armando Hallie, left breast, A, lower outer   quadrant, posterior, coil.  Received in formalin are fragments of   yellow-white fibroadipose tissue aggregating to 2 x 1.7 x 0.2 cm.  1   ns. Fixative:  10% neutral buffered formalin   Tissue removal time:  10:09   Radiology time:  10:10   Tissue fixation time:  10:10   Total fixation time:  9 hours and 50 minutes     B - The container is labeled Armando Hallie, left breast, B, lower outer   quadrant, posterior, coil.  Received in formalin are fragments of   yellow adipose tissue aggregating to 2 x 1.8 x 0.2 cm.  1 ns.    SMW/DKR:[id number]     Fixative:  10% neutral buffered formalin   Tissue removal time:  10:09   Radiology time:  10:10   Tissue fixation time:  10:10   Total fixation time:  9 hours and 50 minutes     Microscopic Examination:   A and B.  The specimen consists of multiple fragments of breast tissue.    There are fibrocystic changes including microcyst formation, usual   ductal hyperplasia, and apocrine metaplasia. Roberto Burgess is an intraductal   papilloma, measuring 2 mm.  There are microcalcifications associated   with benign breast epithelium.  Malignancy is not identified.  Multiple   deeper sections are examined.  Clinical and radiologic correlation is   recommended. Dr. Carissa austin. [id number]                                                     <Sign Out Dr. Miguelito Fisher M.D., F.C.A.P. [facility]/ [address] [place] [address]  Printed on:  3/29/2021   [address] [place] [address]         LOCATION: [facility]       PROCEDURE: ART JÚNIOR DIGITAL SCREEN SELF REFERRAL W OR WO CAD BILATERAL       CLINICAL INFORMATION: Visit for screening mammogram. Tomosynthesis.       Tyrer-Cuzick calculation reports this patient's 10 year risk for developing breast cancer is 3.0% and lifetime risk is 7.0%.        COMPARISON: 9/25/2017 and 4/4/2012.       TECHNIQUE: Bilateral CC and MLO views of the breasts were obtained.  3D tomosynthesis was utilized.  CAD was utilized.       BREAST COMPOSITION: The tissue of the breast(s) is heterogeneously dense. This may lower the sensitivity of mammography.       FINDINGS:         Finding 1: 0.5 cm, cluster of fine microcalcifications. Location:  Lower outer left breast, posterior depth, 8 cm from the nipple on the CC view.       There are bilateral vascular calcifications. No significant masses, calcifications, or other findings are seen in the breast(s).                        Impression   0.5 cm cluster of fine microcalcifications in the left breast. Magnification views and LM view are recommended.       The patient will be contacted by our department per protocol regarding additional imaging and scheduling.                   BI-RADS CATEGORY 0: NEED ADDITIONAL EVALUATION AND/OR PRIOR MAMMOGRAMS FOR COMPARISON.       Management Recommendation: Recall for additional imaging.       The patient will be contacted by our department for additional imaging/scheduling.               **This report has been created using voice recognition software. It may contain minor errors which are inherent in voice recognition technology. **       Final report electronically signed by Dr. Adeline Chacko on 3/19/2021 8:27 A     [facility] JÚNIOR DIGITAL DIAGNOSTIC UNILATERAL LEFT (Order [id number]) - Reflex for Order [id number]  Narrative   LOCATION: [facility]       PROCEDURE: [facility] JÚNIOR DIGITAL DIAGNOSTIC UNILATERAL LEFT       CLINICAL INFORMATION: Breast calcification, left . Tomosynthesis. Left breast calcifications. .       Tyrer-Cuzick calculation reports this patient's 10 year risk for developing breast cancer is 3.0% and lifetime risk is 7.0%.        COMPARISON: 3/18/2021, 9/25/2017.       TECHNIQUE: Images of the left include an LM view and magnification CC, MLO and LM views. . Tomosynthesis and CAD were utilized.        BREAST COMPOSITION: The tissue of the breast(s) is heterogeneously dense. This may lower the sensitivity of mammography.       FINDINGS:        There is a 7 mm cluster of fine microcalcifications in the left outer lower breast, posterior depth seen only on the CC view. These are 8 cm posterior to the nipple. These are confirmed on additional CC images. These cannot be included in the    field-of-view on the LM or MLO views. Based on the tomographic images these are inferior. No other significant masses, calcifications, or other findings are seen in the breast(s).             Impression   Cluster of fine microcalcifications in the lower outer left breast. These are indeterminate. A stereotactic guided biopsy is recommended.       These results were discussed with the patient.  The tech navigator was notified. We will arrange scheduling the patient for her procedure per department protocol.                   BI-RADS CATEGORY 4B - Intermediate suspicion for malignancy.       Management: Tissue diagnosis.  Biopsy should be performed in the absence of clinical contraindication.               **This report has been created using voice recognition software. It may contain minor errors which are inherent in voice recognition technology. **       Final report electronically signed by Dr. Chuck Banda on 3/22/2021 3:43 PM

## 2021-09-24 ENCOUNTER — HOSPITAL ENCOUNTER (OUTPATIENT)
Dept: WOMENS IMAGING | Age: 61
Discharge: HOME | End: 2021-09-24
Payer: COMMERCIAL

## 2021-09-24 DIAGNOSIS — D24.2 INTRADUCTAL PAPILLOMA OF LEFT BREAST: ICD-10-CM

## 2021-09-24 DIAGNOSIS — R92.0 MICROCALCIFICATION OF LEFT BREAST ON MAMMOGRAM: ICD-10-CM

## 2021-09-24 PROCEDURE — G0279 TOMOSYNTHESIS, MAMMO: HCPCS

## 2022-03-30 ENCOUNTER — HOSPITAL ENCOUNTER (OUTPATIENT)
Dept: WOMENS IMAGING | Age: 62
Discharge: HOME | End: 2022-03-30
Payer: COMMERCIAL

## 2022-03-30 DIAGNOSIS — R92.2 BREAST DENSITY: ICD-10-CM

## 2022-03-30 DIAGNOSIS — Z09 FOLLOW-UP EXAM: ICD-10-CM

## 2022-03-30 PROCEDURE — G0279 TOMOSYNTHESIS, MAMMO: HCPCS

## 2022-12-16 ENCOUNTER — APPOINTMENT (OUTPATIENT)
Dept: INTERVENTIONAL RADIOLOGY/VASCULAR | Age: 62
End: 2022-12-16
Payer: COMMERCIAL

## 2022-12-16 ENCOUNTER — APPOINTMENT (OUTPATIENT)
Dept: CT IMAGING | Age: 62
End: 2022-12-16
Payer: COMMERCIAL

## 2022-12-16 ENCOUNTER — APPOINTMENT (OUTPATIENT)
Dept: GENERAL RADIOLOGY | Age: 62
End: 2022-12-16
Payer: COMMERCIAL

## 2022-12-16 ENCOUNTER — HOSPITAL ENCOUNTER (INPATIENT)
Age: 62
LOS: 1 days | Discharge: HOME | End: 2022-12-17
Attending: PHYSICIAN ASSISTANT | Admitting: PHYSICIAN ASSISTANT
Payer: COMMERCIAL

## 2022-12-16 DIAGNOSIS — D69.6 THROMBOCYTOPENIA (HCC): ICD-10-CM

## 2022-12-16 DIAGNOSIS — I26.99 ACUTE PULMONARY EMBOLISM, UNSPECIFIED PULMONARY EMBOLISM TYPE, UNSPECIFIED WHETHER ACUTE COR PULMONALE PRESENT (HCC): Primary | ICD-10-CM

## 2022-12-16 LAB
ALBUMIN SERPL-MCNC: 3.6 G/DL (ref 3.5–5.1)
ALP BLD-CCNC: 69 U/L (ref 38–126)
ALT SERPL-CCNC: 11 U/L (ref 11–66)
ANION GAP SERPL CALCULATED.3IONS-SCNC: 17 MEQ/L (ref 8–16)
APTT: 160.8 SECONDS (ref 22–38)
AST SERPL-CCNC: 15 U/L (ref 5–40)
BASOPHILS # BLD: 0.5 %
BASOPHILS ABSOLUTE: 0.1 THOU/MM3 (ref 0–0.1)
BILIRUB SERPL-MCNC: 0.3 MG/DL (ref 0.3–1.2)
BILIRUBIN DIRECT: < 0.2 MG/DL (ref 0–0.3)
BUN BLDV-MCNC: 11 MG/DL (ref 7–22)
CALCIUM SERPL-MCNC: 9 MG/DL (ref 8.5–10.5)
CHLORIDE BLD-SCNC: 106 MEQ/L (ref 98–111)
CO2: 18 MEQ/L (ref 23–33)
CREAT SERPL-MCNC: 0.8 MG/DL (ref 0.4–1.2)
CRENATED RBC'S: ABNORMAL
D-DIMER QUANTITATIVE: ABNORMAL NG/ML FEU (ref 0–500)
DIFFERENTIAL TYPE: ABNORMAL
EKG ATRIAL RATE: 80 BPM
EKG ATRIAL RATE: 96 BPM
EKG P AXIS: 64 DEGREES
EKG P AXIS: 74 DEGREES
EKG P-R INTERVAL: 140 MS
EKG P-R INTERVAL: 152 MS
EKG Q-T INTERVAL: 340 MS
EKG Q-T INTERVAL: 406 MS
EKG QRS DURATION: 72 MS
EKG QRS DURATION: 76 MS
EKG QTC CALCULATION (BAZETT): 429 MS
EKG QTC CALCULATION (BAZETT): 468 MS
EKG R AXIS: 40 DEGREES
EKG R AXIS: 64 DEGREES
EKG T AXIS: 66 DEGREES
EKG T AXIS: 76 DEGREES
EKG VENTRICULAR RATE: 80 BPM
EKG VENTRICULAR RATE: 96 BPM
EOSINOPHIL # BLD: 1.2 %
EOSINOPHILS ABSOLUTE: 0.2 THOU/MM3 (ref 0–0.4)
ERYTHROCYTE [DISTWIDTH] IN BLOOD BY AUTOMATED COUNT: 19.1 % (ref 11.5–14.5)
ERYTHROCYTE [DISTWIDTH] IN BLOOD BY AUTOMATED COUNT: 56.9 FL (ref 35–45)
GFR SERPL CREATININE-BSD FRML MDRD: > 60 ML/MIN/1.73M2
GLUCOSE BLD-MCNC: 81 MG/DL (ref 70–108)
HCT VFR BLD CALC: 34.2 % (ref 37–47)
HEMOGLOBIN: 10.7 GM/DL (ref 12–16)
HEPARIN UNFRACTIONATED: 0.93 U/ML (ref 0.3–0.7)
IMMATURE GRANS (ABS): 0.12 THOU/MM3 (ref 0–0.07)
IMMATURE GRANULOCYTES: 0.9 %
INR BLD: 1.32 (ref 0.85–1.13)
LYMPHOCYTES # BLD: 16.2 %
LYMPHOCYTES ABSOLUTE: 2.1 THOU/MM3 (ref 1–4.8)
MCH RBC QN AUTO: 25.7 PG (ref 26–33)
MCHC RBC AUTO-ENTMCNC: 31.3 GM/DL (ref 32.2–35.5)
MCV RBC AUTO: 82 FL (ref 81–99)
MONOCYTES # BLD: 8.4 %
MONOCYTES ABSOLUTE: 1.1 THOU/MM3 (ref 0.4–1.3)
NUCLEATED RED BLOOD CELLS: 0 /100 WBC
OSMOLALITY CALCULATION: 279.7 MOSMOL/KG (ref 275–300)
PATHOLOGIST REVIEW: ABNORMAL
PLATELET # BLD: 58 THOU/MM3 (ref 130–400)
PLATELET ESTIMATE: ABNORMAL
PMV BLD AUTO: ABNORMAL FL (ref 9.4–12.4)
POTASSIUM SERPL-SCNC: 3.6 MEQ/L (ref 3.5–5.2)
RBC # BLD: 4.17 MILL/MM3 (ref 4.2–5.4)
SCAN OF BLOOD SMEAR: NORMAL
SEG NEUTROPHILS: 72.8 %
SEGMENTED NEUTROPHILS ABSOLUTE COUNT: 9.3 THOU/MM3 (ref 1.8–7.7)
SODIUM BLD-SCNC: 141 MEQ/L (ref 135–145)
T4 FREE: 1.34 NG/DL (ref 0.93–1.76)
TOTAL PROTEIN: 7 G/DL (ref 6.1–8)
TROPONIN T: < 0.01 NG/ML
WBC # BLD: 12.8 THOU/MM3 (ref 4.8–10.8)

## 2022-12-16 PROCEDURE — 96365 THER/PROPH/DIAG IV INF INIT: CPT

## 2022-12-16 PROCEDURE — 99223 1ST HOSP IP/OBS HIGH 75: CPT

## 2022-12-16 PROCEDURE — 93970 EXTREMITY STUDY: CPT

## 2022-12-16 PROCEDURE — 84484 ASSAY OF TROPONIN QUANT: CPT

## 2022-12-16 PROCEDURE — 6370000000 HC RX 637 (ALT 250 FOR IP)

## 2022-12-16 PROCEDURE — 93005 ELECTROCARDIOGRAM TRACING: CPT | Performed by: NURSE PRACTITIONER

## 2022-12-16 PROCEDURE — 96375 TX/PRO/DX INJ NEW DRUG ADDON: CPT

## 2022-12-16 PROCEDURE — 80053 COMPREHEN METABOLIC PANEL: CPT

## 2022-12-16 PROCEDURE — 6360000004 HC RX CONTRAST MEDICATION: Performed by: NURSE PRACTITIONER

## 2022-12-16 PROCEDURE — 85730 THROMBOPLASTIN TIME PARTIAL: CPT

## 2022-12-16 PROCEDURE — 84439 ASSAY OF FREE THYROXINE: CPT

## 2022-12-16 PROCEDURE — 71275 CT ANGIOGRAPHY CHEST: CPT

## 2022-12-16 PROCEDURE — 85379 FIBRIN DEGRADATION QUANT: CPT

## 2022-12-16 PROCEDURE — 85610 PROTHROMBIN TIME: CPT

## 2022-12-16 PROCEDURE — 85025 COMPLETE CBC W/AUTO DIFF WBC: CPT

## 2022-12-16 PROCEDURE — 85520 HEPARIN ASSAY: CPT

## 2022-12-16 PROCEDURE — 96366 THER/PROPH/DIAG IV INF ADDON: CPT

## 2022-12-16 PROCEDURE — 36415 COLL VENOUS BLD VENIPUNCTURE: CPT

## 2022-12-16 PROCEDURE — 1200000003 HC TELEMETRY R&B

## 2022-12-16 PROCEDURE — 99285 EMERGENCY DEPT VISIT HI MDM: CPT

## 2022-12-16 PROCEDURE — 6360000002 HC RX W HCPCS: Performed by: NURSE PRACTITIONER

## 2022-12-16 PROCEDURE — 93010 ELECTROCARDIOGRAM REPORT: CPT | Performed by: INTERNAL MEDICINE

## 2022-12-16 PROCEDURE — 2580000003 HC RX 258

## 2022-12-16 PROCEDURE — 82248 BILIRUBIN DIRECT: CPT

## 2022-12-16 RX ORDER — ACETAMINOPHEN 325 MG/1
650 TABLET ORAL EVERY 6 HOURS PRN
Status: DISCONTINUED | OUTPATIENT
Start: 2022-12-16 | End: 2022-12-17 | Stop reason: HOSPADM

## 2022-12-16 RX ORDER — SUMATRIPTAN 50 MG/1
100 TABLET, FILM COATED ORAL
Status: COMPLETED | OUTPATIENT
Start: 2022-12-16 | End: 2022-12-16

## 2022-12-16 RX ORDER — HEPARIN SODIUM 10000 [USP'U]/100ML
5-30 INJECTION, SOLUTION INTRAVENOUS CONTINUOUS
Status: DISCONTINUED | OUTPATIENT
Start: 2022-12-16 | End: 2022-12-16

## 2022-12-16 RX ORDER — BISACODYL 10 MG
10 SUPPOSITORY, RECTAL RECTAL DAILY PRN
Status: DISCONTINUED | OUTPATIENT
Start: 2022-12-16 | End: 2022-12-17 | Stop reason: HOSPADM

## 2022-12-16 RX ORDER — LEVOTHYROXINE SODIUM 0.07 MG/1
75 TABLET ORAL DAILY
Status: DISCONTINUED | OUTPATIENT
Start: 2022-12-16 | End: 2022-12-16

## 2022-12-16 RX ORDER — SODIUM CHLORIDE 9 MG/ML
INJECTION, SOLUTION INTRAVENOUS CONTINUOUS
Status: DISCONTINUED | OUTPATIENT
Start: 2022-12-16 | End: 2022-12-17

## 2022-12-16 RX ORDER — POTASSIUM CHLORIDE 20 MEQ/1
40 TABLET, EXTENDED RELEASE ORAL PRN
Status: DISCONTINUED | OUTPATIENT
Start: 2022-12-16 | End: 2022-12-17 | Stop reason: HOSPADM

## 2022-12-16 RX ORDER — ROSUVASTATIN CALCIUM 10 MG/1
10 TABLET, COATED ORAL NIGHTLY
Status: DISCONTINUED | OUTPATIENT
Start: 2022-12-16 | End: 2022-12-16 | Stop reason: ALTCHOICE

## 2022-12-16 RX ORDER — MAGNESIUM SULFATE IN WATER 40 MG/ML
2000 INJECTION, SOLUTION INTRAVENOUS PRN
Status: DISCONTINUED | OUTPATIENT
Start: 2022-12-16 | End: 2022-12-17 | Stop reason: HOSPADM

## 2022-12-16 RX ORDER — TOPIRAMATE 100 MG/1
100 TABLET, FILM COATED ORAL 2 TIMES DAILY
Status: DISCONTINUED | OUTPATIENT
Start: 2022-12-16 | End: 2022-12-17 | Stop reason: HOSPADM

## 2022-12-16 RX ORDER — ACETAMINOPHEN 650 MG/1
650 SUPPOSITORY RECTAL EVERY 6 HOURS PRN
Status: DISCONTINUED | OUTPATIENT
Start: 2022-12-16 | End: 2022-12-17 | Stop reason: HOSPADM

## 2022-12-16 RX ORDER — ZOLPIDEM TARTRATE 5 MG/1
10 TABLET ORAL NIGHTLY PRN
Status: DISCONTINUED | OUTPATIENT
Start: 2022-12-16 | End: 2022-12-17 | Stop reason: HOSPADM

## 2022-12-16 RX ORDER — SODIUM CHLORIDE 9 MG/ML
INJECTION, SOLUTION INTRAVENOUS PRN
Status: DISCONTINUED | OUTPATIENT
Start: 2022-12-16 | End: 2022-12-17 | Stop reason: HOSPADM

## 2022-12-16 RX ORDER — ONDANSETRON 4 MG/1
4 TABLET, ORALLY DISINTEGRATING ORAL EVERY 8 HOURS PRN
COMMUNITY

## 2022-12-16 RX ORDER — SODIUM CHLORIDE 0.9 % (FLUSH) 0.9 %
5-40 SYRINGE (ML) INJECTION EVERY 12 HOURS SCHEDULED
Status: DISCONTINUED | OUTPATIENT
Start: 2022-12-16 | End: 2022-12-17 | Stop reason: HOSPADM

## 2022-12-16 RX ORDER — POLYETHYLENE GLYCOL 3350 17 G/17G
17 POWDER, FOR SOLUTION ORAL DAILY PRN
Status: DISCONTINUED | OUTPATIENT
Start: 2022-12-16 | End: 2022-12-17 | Stop reason: HOSPADM

## 2022-12-16 RX ORDER — ONDANSETRON 4 MG/1
4 TABLET, ORALLY DISINTEGRATING ORAL EVERY 8 HOURS PRN
Status: ON HOLD | COMMUNITY
End: 2022-12-16

## 2022-12-16 RX ORDER — ONDANSETRON 4 MG/1
4 TABLET, FILM COATED ORAL EVERY 8 HOURS PRN
Status: ON HOLD | COMMUNITY
End: 2022-12-16

## 2022-12-16 RX ORDER — LEVOTHYROXINE SODIUM 88 UG/1
88 TABLET ORAL DAILY
Status: DISCONTINUED | OUTPATIENT
Start: 2022-12-16 | End: 2022-12-17 | Stop reason: HOSPADM

## 2022-12-16 RX ORDER — CITALOPRAM 40 MG/1
40 TABLET ORAL DAILY
Status: DISCONTINUED | OUTPATIENT
Start: 2022-12-16 | End: 2022-12-17 | Stop reason: HOSPADM

## 2022-12-16 RX ORDER — ONDANSETRON 2 MG/ML
4 INJECTION INTRAMUSCULAR; INTRAVENOUS EVERY 6 HOURS PRN
Status: DISCONTINUED | OUTPATIENT
Start: 2022-12-16 | End: 2022-12-17 | Stop reason: HOSPADM

## 2022-12-16 RX ORDER — PANTOPRAZOLE SODIUM 40 MG/1
40 TABLET, DELAYED RELEASE ORAL DAILY
Status: DISCONTINUED | OUTPATIENT
Start: 2022-12-16 | End: 2022-12-17 | Stop reason: HOSPADM

## 2022-12-16 RX ORDER — ONDANSETRON 4 MG/1
4 TABLET, ORALLY DISINTEGRATING ORAL EVERY 8 HOURS PRN
Status: DISCONTINUED | OUTPATIENT
Start: 2022-12-16 | End: 2022-12-17 | Stop reason: HOSPADM

## 2022-12-16 RX ORDER — ATORVASTATIN CALCIUM 10 MG/1
10 TABLET, FILM COATED ORAL NIGHTLY
Status: DISCONTINUED | OUTPATIENT
Start: 2022-12-16 | End: 2022-12-17 | Stop reason: HOSPADM

## 2022-12-16 RX ORDER — LEVOTHYROXINE SODIUM 88 UG/1
88 TABLET ORAL DAILY
COMMUNITY

## 2022-12-16 RX ORDER — TOPIRAMATE 50 MG/1
150 TABLET, FILM COATED ORAL DAILY
Status: DISCONTINUED | OUTPATIENT
Start: 2022-12-16 | End: 2022-12-16

## 2022-12-16 RX ORDER — HEPARIN SODIUM 1000 [USP'U]/ML
80 INJECTION, SOLUTION INTRAVENOUS; SUBCUTANEOUS PRN
Status: DISCONTINUED | OUTPATIENT
Start: 2022-12-16 | End: 2022-12-16

## 2022-12-16 RX ORDER — HEPARIN SODIUM 1000 [USP'U]/ML
40 INJECTION, SOLUTION INTRAVENOUS; SUBCUTANEOUS PRN
Status: DISCONTINUED | OUTPATIENT
Start: 2022-12-16 | End: 2022-12-16

## 2022-12-16 RX ORDER — SODIUM CHLORIDE 0.9 % (FLUSH) 0.9 %
5-40 SYRINGE (ML) INJECTION PRN
Status: DISCONTINUED | OUTPATIENT
Start: 2022-12-16 | End: 2022-12-17 | Stop reason: HOSPADM

## 2022-12-16 RX ORDER — HEPARIN SODIUM 1000 [USP'U]/ML
80 INJECTION, SOLUTION INTRAVENOUS; SUBCUTANEOUS ONCE
Status: COMPLETED | OUTPATIENT
Start: 2022-12-16 | End: 2022-12-16

## 2022-12-16 RX ORDER — KETOROLAC TROMETHAMINE 30 MG/ML
15 INJECTION, SOLUTION INTRAMUSCULAR; INTRAVENOUS ONCE
Status: COMPLETED | OUTPATIENT
Start: 2022-12-16 | End: 2022-12-16

## 2022-12-16 RX ORDER — SIMVASTATIN 20 MG
20 TABLET ORAL NIGHTLY
COMMUNITY

## 2022-12-16 RX ORDER — POTASSIUM CHLORIDE 7.45 MG/ML
10 INJECTION INTRAVENOUS PRN
Status: DISCONTINUED | OUTPATIENT
Start: 2022-12-16 | End: 2022-12-17 | Stop reason: HOSPADM

## 2022-12-16 RX ORDER — CITALOPRAM 20 MG/1
20 TABLET ORAL DAILY
Status: DISCONTINUED | OUTPATIENT
Start: 2022-12-16 | End: 2022-12-16

## 2022-12-16 RX ADMIN — HEPARIN SODIUM 4180 UNITS: 1000 INJECTION, SOLUTION INTRAVENOUS; SUBCUTANEOUS at 05:22

## 2022-12-16 RX ADMIN — TOPIRAMATE 100 MG: 100 TABLET, FILM COATED ORAL at 11:45

## 2022-12-16 RX ADMIN — SODIUM CHLORIDE, PRESERVATIVE FREE 10 ML: 5 INJECTION INTRAVENOUS at 11:47

## 2022-12-16 RX ADMIN — RIVAROXABAN 15 MG: 15 TABLET, FILM COATED ORAL at 11:46

## 2022-12-16 RX ADMIN — IOPAMIDOL 80 ML: 755 INJECTION, SOLUTION INTRAVENOUS at 04:34

## 2022-12-16 RX ADMIN — ZOLPIDEM TARTRATE 10 MG: 5 TABLET ORAL at 20:48

## 2022-12-16 RX ADMIN — CITALOPRAM 40 MG: 40 TABLET, FILM COATED ORAL at 11:46

## 2022-12-16 RX ADMIN — RIVAROXABAN 15 MG: 15 TABLET, FILM COATED ORAL at 16:35

## 2022-12-16 RX ADMIN — LEVOTHYROXINE SODIUM 88 MCG: 0.09 TABLET ORAL at 11:47

## 2022-12-16 RX ADMIN — KETOROLAC TROMETHAMINE 15 MG: 30 INJECTION, SOLUTION INTRAMUSCULAR; INTRAVENOUS at 03:55

## 2022-12-16 RX ADMIN — SUMATRIPTAN SUCCINATE 100 MG: 50 TABLET ORAL at 15:08

## 2022-12-16 RX ADMIN — PANTOPRAZOLE SODIUM 40 MG: 40 TABLET, DELAYED RELEASE ORAL at 11:51

## 2022-12-16 RX ADMIN — SODIUM CHLORIDE: 9 INJECTION, SOLUTION INTRAVENOUS at 11:45

## 2022-12-16 RX ADMIN — HEPARIN SODIUM AND DEXTROSE 18 UNITS/KG/HR: 10000; 5 INJECTION INTRAVENOUS at 05:27

## 2022-12-16 RX ADMIN — ACETAMINOPHEN 650 MG: 325 TABLET ORAL at 20:48

## 2022-12-16 RX ADMIN — ATORVASTATIN CALCIUM 10 MG: 10 TABLET, FILM COATED ORAL at 20:49

## 2022-12-16 ASSESSMENT — PAIN SCALES - GENERAL
PAINLEVEL_OUTOF10: 5
PAINLEVEL_OUTOF10: 7
PAINLEVEL_OUTOF10: 3
PAINLEVEL_OUTOF10: 4
PAINLEVEL_OUTOF10: 3

## 2022-12-16 ASSESSMENT — PAIN - FUNCTIONAL ASSESSMENT
PAIN_FUNCTIONAL_ASSESSMENT: NONE - DENIES PAIN
PAIN_FUNCTIONAL_ASSESSMENT: 0-10
PAIN_FUNCTIONAL_ASSESSMENT: 0-10

## 2022-12-16 ASSESSMENT — ENCOUNTER SYMPTOMS
NAUSEA: 1
COUGH: 1
SHORTNESS OF BREATH: 1
ABDOMINAL DISTENTION: 0
RHINORRHEA: 0
CHEST TIGHTNESS: 0
ABDOMINAL PAIN: 0
COLOR CHANGE: 0
WHEEZING: 0
SORE THROAT: 1
DIARRHEA: 0

## 2022-12-16 ASSESSMENT — PAIN DESCRIPTION - DESCRIPTORS: DESCRIPTORS: ACHING

## 2022-12-16 ASSESSMENT — PAIN DESCRIPTION - LOCATION
LOCATION: BACK
LOCATION: HEAD

## 2022-12-16 ASSESSMENT — PAIN DESCRIPTION - ORIENTATION: ORIENTATION: RIGHT;LEFT;MID

## 2022-12-16 NOTE — ED NOTES
ED to inpatient nurses report    Chief Complaint   Patient presents with    Shortness of Breath    Back Pain      Present to ED from home  LOC: alert and orientated to name, place, date  Vital signs   Vitals:    12/16/22 0353 12/16/22 0444 12/16/22 0446 12/16/22 0521   BP: 99/78 103/72 103/67 97/70   Pulse: 82 88  80   Resp: 22 20 18   Temp:       TempSrc:       SpO2: 98% 99%  98%   Weight:       Height:          Oxygen Baseline 98%    Current needs required room air  Bipap/Cpap No  LDAs:   Peripheral IV 12/16/22 Left Antecubital (Active)   Site Assessment Clean, dry & intact 12/16/22 0446   Line Status Normal saline locked 12/16/22 0446   Dressing Status Clean, dry & intact 12/16/22 0446   Dressing Type Transparent 12/16/22 0323   Dressing Intervention New 12/16/22 0323     Mobility: Requires assistance * 1  Pending ED orders: none  Present condition: stable      C-SSRS Risk of Suicide: No Risk  Swallow Screening    Preferred Language: [place]     Electronically signed by Talia Toussaint, RN on 12/16/2022 at  [address], RN  12/16/22 0559

## 2022-12-16 NOTE — H&P
Hospitalist History & Physical    Patient:  Holden Canchola    Unit/Bed:19/019A  YOB: 1960  MRN: [id number]   Acct: [de-identified]   PCP: Deny Negrete MD  Code Status: No Order    Date of Service: Pt seen/examined on 12/16/22 and admitted to Inpatient with expected LOS greater than two midnights due to medical therapy. Chief Complaint: back pain and shortness of breath    Assessment/Plan:    Unprovoked PE with right heart strain: CTA showed central through distal EARL and LLL emboli with infarctions and signs of right heart stain. Not hypoxic on RA. Discussed with IR the possibility of a  thrombectomy, however she is oxygenating well. Heparin gtt initiated in the ER. No identifiable risk factors for PE. Reports 1 year ago, her daughter had an unprovoked PE. INR 1.32, APTT pending. Discussed with Dr. Ella Smith with heme/onc. He suggested d/c the heparin and started the patient on Xarelto 15 mg BID. He wishes to have the patient f/u with him in the office on Monday. Ordered a venous duplex for DVT eval as it alters the management of long term anticoagulation. Thrombocytopenia:  found on admission, platelets 58. Likely due to sequestering from #1. Blood smear pending. Hematology consulted- will f/u outpatient. Continue to monitor. Hypothyroidism: on synthroid. Pending T4. Hyperlipidemia: on statin    Migraines: On Topamax and Imitrex     Anxiety/Depression: Ambien and celexa    History of Present Illness:  Holden Canchola is a 57 y.o. female with PMHx of hypothyroidism, hyperlipidemia, and migraines who presented to [facility] with chief complaint of left back pain and shortness of breath. Patient states she started having left posterior rib pain on the 12/12 (4 days ago) but thought she pulled a muscle. The pain continued to get worse over the following days. Reports OTC pain relievers helped some, but did not completely resolve the pain.  Yesterday she tried to make her bed and became short of breath. She states she had difficulty speaking in full sentences due to SOB. Patient states she had a lower appetite the last several days. Patient denies recent immobilization, HRT, smoking, and recent illness. She reports her daughter having a PE last year without known cause- her daughter was referred to a hematologist but patient is not aware if they found a cause. Denies fevers/chills, lightheadedness, chest pain, N/V, and abdominal pain. Review of Systems: Pertinent positives as noted in the HPI. All other systems reviewed and negative. Past Medical History:        Diagnosis Date    Anxiety     GERD (gastroesophageal reflux disease)     Hyperlipidemia     Hypothyroid     Intraductal papillomatosis     Migraines     Papilloma 2012    left breast       Past Surgical History:        Procedure Laterality Date    BREAST BIOPSY Left 2004    papilla    BREAST BIOPSY Left 2012    BREAST SURGERY  2012    left breast lumpectomy-Dr. Deras     SECTION      x4    COSMETIC SURGERY  1988    nose and left cheek-broke    ENDOMETRIAL ABLATION  2002    ART STEROTACTIC LOC BREAST BIOPSY LEFT Left 2021    ART STEROTACTIC LOC BREAST BIOPSY LEFT 3/26/2021 Shelia Ibarra MD [address] COLON CA SCRN NOT  [address] IND N/A 10/16/2017    COLONOSCOPY performed by Milagros Garcia MD at [facility]    MN EGD TRANSORAL BIOPSY SINGLE/MULTIPLE Left 10/16/2017    EGD BIOPSY performed by Milagros Garcia MD at [facility]       Home Medications:   No current facility-administered medications on file prior to encounter.      Current Outpatient Medications on File Prior to Encounter   Medication Sig Dispense Refill    rosuvastatin (CRESTOR) 10 MG tablet Take 1 tablet by mouth nightly      levothyroxine (SYNTHROID) 75 MCG tablet take 1 tablet by mouth once daily      zolpidem (AMBIEN) 10 MG tablet Take 10 mg by mouth nightly as needed for Sleep      citalopram (CELEXA) 20 MG tablet       omeprazole (PRILOSEC) 20 MG capsule Take 20 mg by mouth 2 times daily       topiramate (TOPAMAX) 50 MG tablet Take 150 mg by mouth daily       sumatriptan (IMITREX) 100 MG tablet Take 100 mg by mouth once as needed          Allergies:    Patient has no known allergies. Social History:    reports that she has never smoked. She has never used smokeless tobacco. She reports that she does not drink alcohol and does not use drugs. Family History:       Problem Relation Age of Onset    No Known Problems Mother     Heart Disease Father     Colon Cancer Father         [de-identified]    No Known Problems Sister     No Known Problems Brother     No Known Problems Maternal Grandmother     No Known Problems Maternal Grandfather     No Known Problems Paternal Grandmother     No Known Problems Paternal Grandfather     No Known Problems Brother     No Known Problems Sister     No Known Problems Sister     No Known Problems Sister        Diet:  No diet orders on file      Physical Exam:  /65   Pulse 73   Temp 98.2 °F (36.8 °C) (Oral)   Resp 20   Ht 5' 1\" (1.549 m)   Wt 115 lb (52.2 kg)   SpO2 99%   BMI 21.73 kg/m²   General:   Pleasant woman resting in bed in no apparent distress. HEENT:  normocephalic and atraumatic. No scleral icterus. PERR. Neck: supple. No JVD. Lungs: clear to auscultation of the right lung, diminished breath sounds through the left lung. No retractions. Could speak in full sentences. Cardiac: RRR without murmur. Abdomen: soft. Nontender. Bowel sounds positive. Extremities:  No clubbing, cyanosis, or edema x 4. Vasculature: capillary refill < 3 seconds. Palpable LE pulses bilaterally. Skin:  warm and dry. No rashes or lesions. Psych:  Alert and oriented x3. Affect appropriate  Lymph:  No supraclavicular adenopathy. Neurologic:  No focal deficit. No seizures.     Data: (All radiographs, tracings, PFTs, and imaging are personally viewed and interpreted unless otherwise noted)  Labs:   Recent Labs     12/16/22  0337   WBC 12.8*   HGB 10.7*   HCT 34.2*   PLT 58*     Recent Labs     12/16/22  0337      K 3.6      CO2 18*   BUN 11   CREATININE 0.8   CALCIUM 9.0     Recent Labs     12/16/22  0337   AST 15   ALT 11   BILIDIR <0.2   BILITOT 0.3   ALKPHOS 69     Recent Labs     12/16/22  0758   INR 1.32*     No results for input(s): CKTOTAL, TROPONINI in the last 72 hours. Urinalysis:   No results found for: NITRU, WBCUA, BACTERIA, RBCUA, BLOODU, SPECGRAV, GLUCOSEU    EKG: normal sinus rhythm with nonspecific T wave abnormality. Radiology:  CTA CHEST W WO CONTRAST   Final Result      Central through distal left upper lobe and left lower lobe pulmonary    emboli with associated left upper lobe and left lower lobe pulmonary    infarctions, and signs of right heart strain. Minor nonocclusive pulmonary emboli within 2 proximal subsegmental    pulmonary arteries in the right lower lobe. Mostly thrombosed subcentimeter splenic artery aneurysm. This document has been electronically signed by: Rolando Brown. Jasonmaankita Alvarez on    12/16/2022 05:17 AM      All CTs at this facility use dose modulation techniques and iterative    reconstructions, and/or weight-based dosing   when appropriate to reduce radiation to a low as reasonably achievable. 3D Post-processing was performed on this study. CTA CHEST W WO CONTRAST    Result Date: 12/16/2022  ADDENDUM #1  This report was discussed with Mine King RN on Dec 16, 2022 05:21:00 EST. This document has been electronically signed by: Oralia Silva on 12/16/2022 05:21 AM ORIGINAL REPORT * CTA chest. Comparison: None. Technique: Axial sections following intravenous contrast with multiplanar reformats and maximum intensity projections.  Findings: Large near-complete occlusive intraluminal filling defect within the distal left central pulmonary artery embolus just distal to the takeoff of the anterior left upper lobe segmental pulmonary artery. This extends to the segmental and multiple subsegmental pulmonary arteries supplying the apicoposterior segment of the left upper lobe. 2 proximal segmental and several subsegmental pulmonary emboli in the posterior medial left lower lobe. Segmental and subsegmental intraluminal filling defects within pulmonary arteries supplying the superior segment of the left lower lobe. Small ill-defined pulmonary infarction within the lateral left lung apex. Moderate pulmonary infarction within the apical segment of the left upper lobe. 2 small nonocclusive pulmonary emboli within proximal subsegmental pulmonary arteries in the right lower lobe supplying the posterior segment. No significant pleural effusion. Bibasilar subsegmental atelectasis. Right middle lobe segmental atelectasis. Minimal lingular subsegmental atelectasis. Mild right heart strain. No pericardial effusion. Mostly thrombosed splenic artery branch aneurysm just above the left adrenal gland measuring 8 mm. Several subcentimeter low density structures within the anterior liver, likely cysts or hemangiomas. Central through distal left upper lobe and left lower lobe pulmonary emboli with associated left upper lobe and left lower lobe pulmonary infarctions, and signs of right heart strain. Minor nonocclusive pulmonary emboli within 2 proximal subsegmental pulmonary arteries in the right lower lobe. Mostly thrombosed subcentimeter splenic artery aneurysm. This document has been electronically signed by: Kirti Lozada on 12/16/2022 05:17 AM All CTs at this facility use dose modulation techniques and iterative reconstructions, and/or weight-based dosing when appropriate to reduce radiation to a low as reasonably achievable. 3D Post-processing was performed on this study. Tele:   [x] yes             [] no      Thank you [place] MD Leah for the opportunity to be involved in this patient's care.     Electronically signed by Julia Crain YARELIS Dunlap on 12/16/2022 at 8:45 AM

## 2022-12-16 NOTE — ED NOTES
Pt assigned rm [id number]. Floor called prior to transport, spoke with Danae Goss requesting pt be transported to [phone]. Pt transported to [id number].      Nina Peterson  12/16/22 0441

## 2022-12-16 NOTE — ED NOTES
Pt resting on cot with eyes closed. Easily waken with verbal stimuli. Denies pain. Voices no new needs or concerns.  Call light in reach     Rohini Garcia RN  12/16/22 [id number]

## 2022-12-16 NOTE — PROGRESS NOTES
Pharmacy Medication History Note      List of current medications patient is taking is complete. Source of information: patient, Surescripts fill report    Changes made to medication list:  Medications removed (include reason, ex. therapy complete or physician discontinued):  Rosuvastatin - now on simvastatin    Medications added/doses adjusted:  Clarified citalopram as 40mg daily (takes two 20mg tabs)  Increased levothyroxine to 88mcg daily  Added simvastatin 20mg nightly  Clarified Zofran as ODT tabs  Clarified topiramate as 100mg BID   Added Vitamin D daily    Other notes (ex. Recent course of antibiotics, Coumadin dosing):  Patient unsure of Vitamin d strength  Denies use of other OTC or herbal medications.         Electronically signed by Becka Leblanc [facility] on 12/16/2022 at 9:58 AM

## 2022-12-16 NOTE — ED PROVIDER NOTES
[facility] Emergency Department    CHIEF COMPLAINT       Chief Complaint   Patient presents with    Shortness of Breath    Back Pain       Nurses Notes reviewed and I agree except as noted in the HPI. HISTORY OF PRESENT ILLNESS    Cholo Reyes is a 57 y.o. female who presents to the ED for evaluation of shortness of breath, back pain. Patient no symptoms began approximately 3 days ago, pain to the left side of the back and left side. Notes worse with movement. Patient believes that she strained a muscle in her back, but denies any aggravating event to cause the symptoms. Patient notes taking deep breaths seems to make pain worse. She notes some nasal congestion, sore throat, cough. Denies any wheezing, denies any production to the cough. She notes some nausea but denies any vomiting. Denies any change in urination. She has a history of migraine headaches, acid reflux, hyperlipidemia. She notes that she was exposed to someone with COVID-19 a couple of weeks ago, but never produced symptoms. HPI was provided by the patient. REVIEW OF SYSTEMS     Review of Systems   Constitutional:  Negative for activity change, chills and fever. HENT:  Positive for congestion and sore throat. Negative for rhinorrhea. Respiratory:  Positive for cough and shortness of breath. Negative for chest tightness and wheezing. Cardiovascular:  Positive for chest pain. Gastrointestinal:  Positive for nausea. Negative for abdominal distention, abdominal pain and diarrhea. Genitourinary:  Negative for decreased urine volume and difficulty urinating. Musculoskeletal:  Negative for arthralgias, myalgias, neck pain and neck stiffness. Skin:  Negative for color change and rash. Allergic/Immunologic: Negative for immunocompromised state. Neurological:  Negative for dizziness, weakness, light-headedness and numbness. Hematological:  Does not bruise/bleed easily.    Psychiatric/Behavioral:  Negative for agitation, behavioral problems and confusion. PAST MEDICAL HISTORY     Past Medical History:   Diagnosis Date    Anxiety     GERD (gastroesophageal reflux disease)     Hyperlipidemia     Hypothyroid     Intraductal papillomatosis     Migraines     Papilloma     left breast       SURGICALHISTORY      has a past surgical history that includes  section; Cosmetic surgery (); Endometrial ablation (); pr colon ca scrn not hi rsk ind (N/A, 10/16/2017); pr egd transoral biopsy single/multiple (Left, 10/16/2017); Breast surgery (2012); Breast biopsy (Left, 2004); Breast biopsy (Left, 2012); and ART STEREO BREAST BX W LOC DEVICE 1ST LESION LEFT (Left, 2021). CURRENT MEDICATIONS       Previous Medications    CITALOPRAM (CELEXA) 20 MG TABLET        LEVOTHYROXINE (SYNTHROID) 75 MCG TABLET    take 1 tablet by mouth once daily    OMEPRAZOLE (PRILOSEC) 20 MG CAPSULE    Take 20 mg by mouth 2 times daily     ROSUVASTATIN (CRESTOR) 10 MG TABLET    Take 1 tablet by mouth nightly    SUMATRIPTAN (IMITREX) 100 MG TABLET    Take 100 mg by mouth once as needed     TOPIRAMATE (TOPAMAX) 50 MG TABLET    Take 150 mg by mouth daily     ZOLPIDEM (AMBIEN) 10 MG TABLET    Take 10 mg by mouth nightly as needed for Sleep       ALLERGIES     has No Known Allergies. FAMILY HISTORY     She indicated that her mother is alive. She indicated that her father is . She indicated that all of her four sisters are alive. She indicated that both of her brothers are alive. She indicated that her maternal grandmother is . She indicated that her maternal grandfather is . She indicated that her paternal grandmother is . She indicated that her paternal grandfather is .    family history includes Colon Cancer in her father; Heart Disease in her father; No Known Problems in her brother, brother, maternal grandfather, maternal grandmother, mother, paternal grandfather, paternal grandmother, sister, sister, sister, and sister. 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   Tobacco Use    Smoking status: Never    Smokeless tobacco: Never   Substance and Sexual Activity    Alcohol use: No    Drug use: No    Sexual activity: Not on file   Other Topics Concern    Not on file   Social History Narrative    Not on file     Social Determinants of Health     Financial Resource Strain: Not on file   Food Insecurity: Not on file   Transportation Needs: Not on file   Physical Activity: Not on file   Stress: Not on file   Social Connections: Not on file   Intimate Partner Violence: Not on file   Housing Stability: Not on file       PHYSICAL EXAM     INITIAL VITALS:  height is 5' 1\" (1.549 m) and weight is 115 lb (52.2 kg). Her oral temperature is 98.2 °F (36.8 °C). Her blood pressure is 97/70 and her pulse is 80. Her respiration is 18 and oxygen saturation is 98%. Physical Exam  Vitals and nursing note reviewed. Constitutional:       Appearance: Normal appearance. She is well-developed. HENT:      Head: Normocephalic. Mouth/Throat:      Pharynx: Uvula midline. Eyes:      Conjunctiva/sclera: Conjunctivae normal.   Cardiovascular:      Rate and Rhythm: Normal rate and regular rhythm. Heart sounds: Normal heart sounds, S1 normal and S2 normal.   Pulmonary:      Effort: Pulmonary effort is normal. No respiratory distress. Breath sounds: Normal breath sounds. Chest:      Chest wall: No tenderness. Abdominal:      General: Bowel sounds are normal. There is no distension. Palpations: Abdomen is soft. Tenderness: There is no abdominal tenderness. Musculoskeletal:         General: Normal range of motion.       Cervical back: Normal range of motion and neck supple. Lymphadenopathy:      Cervical: No cervical adenopathy. Skin:     General: Skin is warm and dry. Neurological:      Mental Status: She is alert and oriented to person, place, and time. Psychiatric:         Speech: Speech normal.         Behavior: Behavior normal.         Thought Content: Thought content normal.       DIFFERENTIAL DIAGNOSIS:   Pneumonia, muscle strain, pulmonary embolism,    DIAGNOSTIC RESULTS     EKG: All EKG's are interpreted by the Emergency Department Physician who eithersigns or Co-signs this chart in the absence of a cardiologist.    EKG read and interpreted by myself gives impression of normal sinus rhythm with heart rate of 96; interval 140; QRS 72;QTc 429; axis P-64, R-40, T-76. RADIOLOGY: non-plainfilm images(s) such as CT, Ultrasound and MRI are read by the radiologist.  Plain radiographic images are visualized and preliminarily interpreted by the emergency physician unless otherwise stated below. CTA CHEST W WO CONTRAST   Final Result      Central through distal left upper lobe and left lower lobe pulmonary    emboli with associated left upper lobe and left lower lobe pulmonary    infarctions, and signs of right heart strain. Minor nonocclusive pulmonary emboli within 2 proximal subsegmental    pulmonary arteries in the right lower lobe. Mostly thrombosed subcentimeter splenic artery aneurysm. This document has been electronically signed by: Moriah Burkett. Clara Umana on    12/16/2022 05:17 AM      All CTs at this facility use dose modulation techniques and iterative    reconstructions, and/or weight-based dosing   when appropriate to reduce radiation to a low as reasonably achievable. 3D Post-processing was performed on this study.             LABS:   Labs Reviewed   CBC WITH AUTO DIFFERENTIAL - Abnormal; Notable for the following components:       Result Value    WBC 12.8 (*)     RBC 4.17 (*)     Hemoglobin 10.7 (*)     Hematocrit 34.2 (*)     MCH 25.7 (*)     MCHC 31.3 (*)     RDW-CV 19.1 (*)     RDW-SD 56.9 (*)     Platelets 58 (*)     Segs Absolute 9.3 (*)     Immature Grans (Abs) 0.12 (*)     All other components within normal limits   BASIC METABOLIC PANEL - Abnormal; Notable for the following components:    CO2 18 (*)     All other components within normal limits   D-DIMER, QUANTITATIVE - Abnormal; Notable for the following components:    D-Dimer, Quant 48610.00 (*)     All other components within normal limits   ANION GAP - Abnormal; Notable for the following components:    Anion Gap 17.0 (*)     All other components within normal limits   HEPATIC FUNCTION PANEL   TROPONIN   GLOMERULAR FILTRATION RATE, ESTIMATED   OSMOLALITY   SCAN OF BLOOD SMEAR   APTT   PROTIME-INR   ANTI-XA, UNFRACTIONATED HEPARIN   ANTI-XA, UNFRACTIONATED HEPARIN       EMERGENCY DEPARTMENT COURSE:   Vitals:    Vitals:    12/16/22 0353 12/16/22 0444 12/16/22 0446 12/16/22 0521   BP: 99/78 103/72 103/67 97/70   Pulse: 82 88  80   Resp: 22 20  18   Temp:       TempSrc:       SpO2: 98% 99%  98%   Weight:       Height:           MDM    Patient was seen and evaluated in the emergency department, patient appeared to be in no acute distress, vital signs reviewed, no significant findings are noted. Physical exam is completed, there is no significant findings noted on exam, lungs are clear, there are some mild tenderness to the left posterior ribs. Labs were ordered, noted significant thrombocytopenia, and elevated D-dimer. CTA of the chest is obtained, pulmonary embolism noted. Patient started on heparin drip. Discussed the case with hospitalist service, Dr. Bennie Holder who accepts patient for admission. Discussed my findings and plan of care with patient she is amenable with admission. While here in the ER the patient maintained stable course and appropriate for admission.     Medications   heparin (porcine) injection 4,180 Units (has no administration in time range) heparin (porcine) injection 4,180 Units (has no administration in time range)   heparin (porcine) injection 2,090 Units (has no administration in time range)   heparin 25,000 units in dextrose 5% 250 mL (premix) infusion (has no administration in time range)   ketorolac (TORADOL) injection 15 mg (15 mg IntraVENous Given 12/16/22 9636)   iopamidol (ISOVUE-370) 76 % injection 80 mL (80 mLs IntraVENous Given 12/16/22 0582)       Patient was seenindependently by myself. The patient's final impression and disposition and plan was determined by myself. CRITICAL CARE:   None    CONSULTS:  Hospitalist    PROCEDURES:  None    FINAL IMPRESSION     1. Acute pulmonary embolism, unspecified pulmonary embolism type, unspecified whether acute cor pulmonale present ([facility] [address]    2. Thrombocytopenia ([facility] [address]          DISPOSITION/PLAN   Patient admitted    PATIENT REFERREDTO:  No follow-up provider specified. DISCHARGE MEDICATIONS:  New Prescriptions    No medications on file       (Please note that portions of this note were completed with a voice recognition program.  Efforts were made to edit the dictations but occasionally words are mis-transcribed.)      Provider:  I personally performed the services described in the documentation,reviewed and edited the documentation which was dictated to the scribe in my presence, and it accurately records my words and actions.     Mirza Negrete CNP 12/16/22 5:24 AM    JASVIR Jin CNP         Sosedi, APRN - CNP  12/16/22 8532

## 2022-12-16 NOTE — PLAN OF CARE
Problem: Discharge Planning  Goal: Discharge to home or other facility with appropriate resources  Outcome: Progressing     Problem: Discharge Planning  Goal: Discharge to home or other facility with appropriate resources  Outcome: Progressing     Problem: Safety - Adult  Goal: Free from fall injury  Outcome: Progressing     Problem: Safety - Adult  Goal: Free from fall injury  Outcome: Progressing     Problem: ABCDS Injury Assessment  Goal: Absence of physical injury  Outcome: Progressing     Problem: ABCDS Injury Assessment  Goal: Absence of physical injury  Outcome: Progressing

## 2022-12-17 VITALS
BODY MASS INDEX: 23.52 KG/M2 | DIASTOLIC BLOOD PRESSURE: 74 MMHG | WEIGHT: 124.56 LBS | OXYGEN SATURATION: 98 % | RESPIRATION RATE: 16 BRPM | TEMPERATURE: 97.9 F | HEIGHT: 61 IN | HEART RATE: 86 BPM | SYSTOLIC BLOOD PRESSURE: 116 MMHG

## 2022-12-17 LAB
ANION GAP SERPL CALCULATED.3IONS-SCNC: 15 MEQ/L (ref 8–16)
BASOPHILS # BLD: 0.3 %
BASOPHILS ABSOLUTE: 0 THOU/MM3 (ref 0–0.1)
BUN BLDV-MCNC: 10 MG/DL (ref 7–22)
CALCIUM SERPL-MCNC: 8.1 MG/DL (ref 8.5–10.5)
CHLORIDE BLD-SCNC: 108 MEQ/L (ref 98–111)
CO2: 18 MEQ/L (ref 23–33)
CREAT SERPL-MCNC: 0.6 MG/DL (ref 0.4–1.2)
EOSINOPHIL # BLD: 1.1 %
EOSINOPHILS ABSOLUTE: 0.1 THOU/MM3 (ref 0–0.4)
ERYTHROCYTE [DISTWIDTH] IN BLOOD BY AUTOMATED COUNT: 18.8 % (ref 11.5–14.5)
ERYTHROCYTE [DISTWIDTH] IN BLOOD BY AUTOMATED COUNT: 55.9 FL (ref 35–45)
GFR SERPL CREATININE-BSD FRML MDRD: > 60 ML/MIN/1.73M2
GLUCOSE BLD-MCNC: 73 MG/DL (ref 70–108)
HCT VFR BLD CALC: 29.1 % (ref 37–47)
HEMOGLOBIN: 9.4 GM/DL (ref 12–16)
IMMATURE GRANS (ABS): 0.1 THOU/MM3 (ref 0–0.07)
IMMATURE GRANULOCYTES: 0.8 %
LYMPHOCYTES # BLD: 16.5 %
LYMPHOCYTES ABSOLUTE: 2 THOU/MM3 (ref 1–4.8)
MAGNESIUM: 2 MG/DL (ref 1.6–2.4)
MCH RBC QN AUTO: 26.3 PG (ref 26–33)
MCHC RBC AUTO-ENTMCNC: 32.3 GM/DL (ref 32.2–35.5)
MCV RBC AUTO: 81.5 FL (ref 81–99)
MONOCYTES # BLD: 7.6 %
MONOCYTES ABSOLUTE: 0.9 THOU/MM3 (ref 0.4–1.3)
NUCLEATED RED BLOOD CELLS: 0 /100 WBC
PLATELET # BLD: 73 THOU/MM3 (ref 130–400)
PMV BLD AUTO: ABNORMAL FL (ref 9.4–12.4)
POTASSIUM REFLEX MAGNESIUM: 3.5 MEQ/L (ref 3.5–5.2)
RBC # BLD: 3.57 MILL/MM3 (ref 4.2–5.4)
SEG NEUTROPHILS: 73.7 %
SEGMENTED NEUTROPHILS ABSOLUTE COUNT: 8.8 THOU/MM3 (ref 1.8–7.7)
SODIUM BLD-SCNC: 141 MEQ/L (ref 135–145)
WBC # BLD: 11.9 THOU/MM3 (ref 4.8–10.8)

## 2022-12-17 PROCEDURE — 36415 COLL VENOUS BLD VENIPUNCTURE: CPT

## 2022-12-17 PROCEDURE — 83735 ASSAY OF MAGNESIUM: CPT

## 2022-12-17 PROCEDURE — 2580000003 HC RX 258

## 2022-12-17 PROCEDURE — 99232 SBSQ HOSP IP/OBS MODERATE 35: CPT | Performed by: NURSE PRACTITIONER

## 2022-12-17 PROCEDURE — 80048 BASIC METABOLIC PNL TOTAL CA: CPT

## 2022-12-17 PROCEDURE — 6370000000 HC RX 637 (ALT 250 FOR IP)

## 2022-12-17 PROCEDURE — 85025 COMPLETE CBC W/AUTO DIFF WBC: CPT

## 2022-12-17 PROCEDURE — 6360000002 HC RX W HCPCS

## 2022-12-17 PROCEDURE — 99239 HOSP IP/OBS DSCHRG MGMT >30: CPT | Performed by: NURSE PRACTITIONER

## 2022-12-17 PROCEDURE — 93306 TTE W/DOPPLER COMPLETE: CPT

## 2022-12-17 PROCEDURE — 93356 MYOCRD STRAIN IMG SPCKL TRCK: CPT

## 2022-12-17 PROCEDURE — 6370000000 HC RX 637 (ALT 250 FOR IP): Performed by: NURSE PRACTITIONER

## 2022-12-17 RX ORDER — SUMATRIPTAN 50 MG/1
100 TABLET, FILM COATED ORAL
Status: COMPLETED | OUTPATIENT
Start: 2022-12-17 | End: 2022-12-17

## 2022-12-17 RX ADMIN — PANTOPRAZOLE SODIUM 40 MG: 40 TABLET, DELAYED RELEASE ORAL at 08:05

## 2022-12-17 RX ADMIN — ONDANSETRON 4 MG: 2 INJECTION INTRAMUSCULAR; INTRAVENOUS at 12:06

## 2022-12-17 RX ADMIN — CITALOPRAM 40 MG: 40 TABLET, FILM COATED ORAL at 08:05

## 2022-12-17 RX ADMIN — SODIUM CHLORIDE: 9 INJECTION, SOLUTION INTRAVENOUS at 02:17

## 2022-12-17 RX ADMIN — LEVOTHYROXINE SODIUM 88 MCG: 0.09 TABLET ORAL at 05:47

## 2022-12-17 RX ADMIN — ACETAMINOPHEN 650 MG: 325 TABLET ORAL at 09:55

## 2022-12-17 RX ADMIN — SUMATRIPTAN SUCCINATE 100 MG: 50 TABLET ORAL at 13:04

## 2022-12-17 RX ADMIN — RIVAROXABAN 15 MG: 15 TABLET, FILM COATED ORAL at 08:05

## 2022-12-17 RX ADMIN — TOPIRAMATE 100 MG: 100 TABLET, FILM COATED ORAL at 08:05

## 2022-12-17 ASSESSMENT — PAIN DESCRIPTION - LOCATION
LOCATION: HEAD

## 2022-12-17 ASSESSMENT — PAIN DESCRIPTION - DESCRIPTORS
DESCRIPTORS: ACHING

## 2022-12-17 ASSESSMENT — PAIN SCALES - GENERAL
PAINLEVEL_OUTOF10: 8
PAINLEVEL_OUTOF10: 6
PAINLEVEL_OUTOF10: 6

## 2022-12-17 NOTE — DISCHARGE SUMMARY
Hospital Medicine Discharge Summary      Patient Identification:   Lebron Mercado   : 1960  MRN: [id number]   Account: [de-identified]      Patient's PCP: Teddy Nolen MD    Admit Date: 2022     Discharge Date:   2022    Admitting Physician: Nai Dietrich PA-C     Discharging Nurse Practitioner: JASVIR Benavidez CNP     Discharge Diagnoses with Assessment/Plan:  Unprovoked pulmonary embolism (POA) with right heart strain with moderate pulmonary infarction--admitting provider discussed with IR on  regarding the possibility of a thrombectomy however she was oxygenating well; admitting provider spoke with hematology, Dr. Rebecca Crystal and he suggested to stop the heparin drip and start the patient on Xarelto 15 mg twice a day and to follow-up with him on the office on  at 3 PM; venous Doppler of the bilateral lower legs did not reveal a DVT; may need evaluated for hypercoagulable work-up on an outpatient basis; echo with EF 60%, normal right ventricular size and function, RV for CS L strain is normal -19.6%; started Xarelto 15 mg twice a day on  for 21 days then transition to Xarelto 20 mg daily; patient ambulated in the hallway without any issue  Thrombocytopenia--platelet count was noted to be 58 on admission, likely secondary to #1, today up to 73  Hypothyroidism--treated, free T4 1.34  Hyperlipidemia--treated with statin  Migraine headaches--treated  Anion gap metabolic acidosis--improved  Anxiety/depression--treated     The patient was seen and examined on day of discharge and this discharge summary is in conjunction with any daily progress note from day of discharge.     Hospital Course:   Lebron Mercado is a 57 y.o. female admitted to [address] [place] [address] on 2022 for back pain and shortness of breath; Per H&P done 2022: \"Blaire Villa is a 57 y.o. female with PMHx of hypothyroidism, hyperlipidemia, and migraines who presented to [facility] with chief complaint of left back pain and shortness of breath. Patient states she started having left posterior rib pain on the 12/12 (4 days ago) but thought she pulled a muscle. The pain continued to get worse over the following days. Reports OTC pain relievers helped some, but did not completely resolve the pain. Yesterday she tried to make her bed and became short of breath. She states she had difficulty speaking in full sentences due to SOB. Patient states she had a lower appetite the last several days. Patient denies recent immobilization, HRT, smoking, and recent illness. She reports her daughter having a PE last year without known cause- her daughter was referred to a hematologist but patient is not aware if they found a cause. Denies fevers/chills, lightheadedness, chest pain, N/V, and abdominal pain. \"     12/17--> hemodynamically stable, did have an isolated heart rate of 112 at 345 this morning otherwise satting 97 to 100% on room air; patient complains of a headache rates 5 out of 10 along with left-sided back pain, she states her shortness of breath is good, she is eating well, she is ambulating to the restroom, family at bedside, patient lives with her spouse and feels very comfortable in going home and to follow-up with hematology on Monday, patient ambulated in the hallway without any issue, echocardiogram reviewed, patient will be discharged home with hematology follow-up on December 19.          Exam:     Vitals:  Vitals:    12/16/22 2040 12/16/22 2336 12/17/22 0347 12/17/22 0759   BP: 116/81 100/64 99/66 111/71   Pulse: 98 91 (!) 112 100   Resp: 20 16 16 16   Temp: 99.9 °F (37.7 °C) 98.8 °F (37.1 °C) 98.4 °F (36.9 °C) 98.2 °F (36.8 °C)   TempSrc: Oral Oral Oral Oral   SpO2:  97% 97% 98%   Weight:   124 lb 9 oz (56.5 kg)    Height:         Weight: Weight: 124 lb 9 oz (56.5 kg)     24 hour intake/output:  Intake/Output Summary (Last 24 hours) at 12/17/2022 1051  Last data filed at 12/16/2022 1513  Gross per 24 hour   Intake 480 ml   Output --   Net 480 ml         General appearance:  No apparent distress, appears stated age and cooperative. HEENT:  Normal cephalic, atraumatic without obvious deformity. Pupils equal, round, and reactive to light. Conjunctivae/corneas clear. Neck: Supple, with full range of motion. No jugular venous distention. Trachea midline. Respiratory:  Normal respiratory effort. Clear to auscultation, bilaterally without Rales/Wheezes/Rhonchi. Cardiovascular:  Regular rate and rhythm with normal S1/S2 without murmurs, rubs or gallops. Abdomen: Soft, non-tender, non-distended with normal bowel sounds. Musculoskeletal:  No clubbing, cyanosis or edema bilaterally. Full range of motion without deformity. Skin: Skin color, texture, turgor normal.    Neurologic:  Neurovascularly intact without any focal sensory/motor deficits. Cranial nerves: II-XII intact, grossly non-focal.  Psychiatric:  Alert and oriented, thought content appropriate  Capillary Refill: Brisk,< 3 seconds   Peripheral Pulses: +2 palpable, equal bilaterally       Labs: For convenience and continuity at follow-up the following most recent labs are provided:      CBC:    Lab Results   Component Value Date/Time    WBC 11.9 12/17/2022 05:22 AM    HGB 9.4 12/17/2022 05:22 AM    HCT 29.1 12/17/2022 05:22 AM    PLT 73 12/17/2022 05:22 AM       Renal:    Lab Results   Component Value Date/Time     12/17/2022 05:22 AM    K 3.5 12/17/2022 05:22 AM     12/17/2022 05:22 AM    CO2 18 12/17/2022 05:22 AM    BUN 10 12/17/2022 05:22 AM    CREATININE 0.6 12/17/2022 05:22 AM    CALCIUM 8.1 12/17/2022 05:22 AM       Cardiac:   Recent Labs     12/16/22  0337   TROPONINT < 0.010       Significant Diagnostic Studies    Radiology:   VL DUP LOWER EXTREMITY VENOUS BILATERAL   Final Result   No evidence of deep venous thrombosis of the bilateral lower extremities.                **This report has been created using voice recognition software. It may contain minor errors which are inherent in voice recognition technology. **      Final report electronically signed by Dr. Candy Howell MD on 12/16/2022 11:14 AM      CTA CHEST W WO CONTRAST   Final Result      Central through distal left upper lobe and left lower lobe pulmonary    emboli with associated left upper lobe and left lower lobe pulmonary    infarctions, and signs of right heart strain. Minor nonocclusive pulmonary emboli within 2 proximal subsegmental    pulmonary arteries in the right lower lobe. Mostly thrombosed subcentimeter splenic artery aneurysm. This document has been electronically signed by: Milton Jean Baptiste. Hua Fuchs on    12/16/2022 05:17 AM      All CTs at this facility use dose modulation techniques and iterative    reconstructions, and/or weight-based dosing   when appropriate to reduce radiation to a low as reasonably achievable. 3D Post-processing was performed on this study. Consults:     IP CONSULT TO HEM/ONC    Disposition:    [x] Home       [] TCU       [] Rehab       [] Psych       [] SNF       [] [facility]       [] Other-    Condition at Discharge: Stable    Code Status:  Full Code     Pending tests at discharge:      none    Patient Instructions:    Discharge lab work: none  Activity: activity as tolerated  Diet: ADULT DIET;  Regular      Follow-up visits:   Lucio Hill MD  [address] [facility] [address] [address] [address] [facility]  [phone]    Follow up on 12/19/2022  appointment time 3pm.    Alhaij Torres MD  [address] [place] [address]  [phone]    Follow up in 1 week(s)           Discharge Medications:        Medication List        START taking these medications      * rivaroxaban 15 MG Tabs tablet  Commonly known as: XARELTO  Take 1 tablet by mouth 2 times daily (with meals) for 39 doses Then transition to Xarelto to 20 mg daily after you complete the 21 days of 15 mg twice a day (you had 3 doses here)     * rivaroxaban 20 MG Tabs tablet  Commonly known as: Xarelto  Take 1 tablet by mouth daily (with breakfast) Start taking on day 22 after you finish the 21 days of the 15 mg BID dosage           * This list has 2 medication(s) that are the same as other medications prescribed for you. Read the directions carefully, and ask your doctor or other care provider to review them with you. CHANGE how you take these medications      levothyroxine 88 MCG tablet  Commonly known as: SYNTHROID  What changed: Another medication with the same name was removed. Continue taking this medication, and follow the directions you see here. Notes to patient: Tomorrow 12/18/22 in morning     ondansetron 4 MG disintegrating tablet  Commonly known as: ZOFRAN-ODT  What changed: Another medication with the same name was removed. Continue taking this medication, and follow the directions you see here.             CONTINUE taking these medications      citalopram 20 MG tablet  Commonly known as: CELEXA  Notes to patient: Tomorrow 12/18/22 in morning     omeprazole 20 MG delayed release capsule  Commonly known as: PRILOSEC     simvastatin 20 MG tablet  Commonly known as: ZOCOR     SUMAtriptan 100 MG tablet  Commonly known as: IMITREX     topiramate 50 MG tablet  Commonly known as: TOPAMAX     VITAMIN D (CHOLECALCIFEROL) PO     zolpidem 10 MG tablet  Commonly known as: AMBIEN            STOP taking these medications      ondansetron 4 MG tablet  Commonly known as: ZOFRAN     rosuvastatin 10 MG tablet  Commonly known as: CRESTOR               Where to Get Your Medications        These medications were sent to [address] [place] [address] [id number] [facility] [address] [facility] [phone] -  [phone]  [address] [place] [address]      Phone: [phone]   rivaroxaban 15 MG Tabs tablet  rivaroxaban 20 MG Tabs tablet         Time Spent on discharge is more than 45 minutes in the examination, evaluation, counseling and review of medications and discharge plan. Signed: Thank you Josefina De Leon MD for the opportunity to be involved in this patient's care.     Electronically signed by JASVIR Tobar CNP on 12/17/2022 at 10:51 AM

## 2022-12-17 NOTE — PLAN OF CARE
Problem: Discharge Planning  Goal: Discharge to home or other facility with appropriate resources  12/17/2022 0128 by Preethi Mcgrath RN  Outcome: Progressing  Flowsheets (Taken 12/17/2022 0128)  Discharge to home or other facility with appropriate resources:   Arrange for needed discharge resources and transportation as appropriate   Identify barriers to discharge with patient and caregiver   Identify discharge learning needs (meds, wound care, etc)   Arrange for interpreters to assist at discharge as needed   Refer to discharge planning if patient needs post-hospital services based on physician order or complex needs related to functional status, cognitive ability or social support system     Problem: Safety - Adult  Goal: Free from fall injury  12/17/2022 0128 by Preethi Mcgrath RN  Outcome: Progressing  Flowsheets (Taken 12/17/2022 0128)  Free From Fall Injury:   Instruct family/caregiver on patient safety   Based on caregiver fall risk screen, instruct family/caregiver to ask for assistance with transferring infant if caregiver noted to have fall risk factors     Problem: ABCDS Injury Assessment  Goal: Absence of physical injury  12/17/2022 0128 by Preethi Mcgrath RN  Outcome: Progressing  Flowsheets (Taken 12/17/2022 0128)  Absence of Physical Injury: Implement safety measures based on patient assessment  Problem: Respiratory - Adult  Goal: Achieves optimal ventilation and oxygenation  12/17/2022 0128 by Preethi Mcgrath RN  Outcome: Progressing  Flowsheets (Taken 12/17/2022 0128)  Achieves optimal ventilation and oxygenation:   Assess for changes in respiratory status   Assess for changes in mentation and behavior   Oxygen supplementation based on oxygen saturation or arterial blood gases   Position to facilitate oxygenation and minimize respiratory effort   Initiate smoking cessation protocol as indicated   Encourage broncho-pulmonary hygiene including cough, deep breathe, incentive spirometry   Assess the need for suctioning and aspirate as needed   Assess and instruct to report shortness of breath or any respiratory difficulty     Problem: Metabolic/Fluid and Electrolytes - Adult  Goal: Electrolytes maintained within normal limits  12/17/2022 0128 by Ashleigh Rizo RN  Outcome: Progressing  Flowsheets (Taken 12/17/2022 0128)  Electrolytes maintained within normal limits:   Monitor labs and assess patient for signs and symptoms of electrolyte imbalances   Administer electrolyte replacement as ordered   Monitor response to electrolyte replacements, including repeat lab results as appropriate   Instruct patient on fluid and nutrition restrictions as appropriate   Fluid restriction as ordered     Problem: Pain  Goal: Verbalizes/displays adequate comfort level or baseline comfort level  12/17/2022 0128 by Ashleigh Rizo RN  Outcome: Progressing  Flowsheets (Taken 12/17/2022 0128)  Verbalizes/displays adequate comfort level or baseline comfort level:   Encourage patient to monitor pain and request assistance   Assess pain using appropriate pain scale   Administer analgesics based on type and severity of pain and evaluate response   Implement non-pharmacological measures as appropriate and evaluate response   Notify Licensed Independent Practitioner if interventions unsuccessful or patient reports new pain   Consider cultural and social influences on pain and pain management  Care plan reviewed with patient. Patient verbalize understanding of the plan of care and contribute to goal setting.

## 2022-12-17 NOTE — DISCHARGE INSTRUCTIONS
Follow up with Dr Hershal Holter on Dec 19 as already scheduled  Take the Xarelto as directed~15 mg twice a day for 39 more doses to equal a total of 21 days then transition to 20 mg daily on day 22

## 2022-12-17 NOTE — PROGRESS NOTES
Hospitalist Progress Note    Patient:  [facility]      Unit/Bed:6K-24/024-A    YOB: 1960    MRN: [id number]       Acct: [de-identified]     PCP: Teddy Nolen MD    Date of Admission: 12/16/2022    Assessment/Plan:    Unprovoked pulmonary embolism (POA) with right heart strain with moderate pulmonary infarction--admitting provider discussed with IR on 12/16 regarding the possibility of a thrombectomy however she was oxygenating well; admitting provider spoke with hematology, Dr. Rebecca Crystal and he suggested to stop the heparin drip and start the patient on Xarelto 15 mg twice a day and to follow-up with him on the office on Monday; venous Doppler of the bilateral lower legs did not reveal a DVT; may need evaluated for hypercoagulable work-up on an outpatient basis; echo is pending; started Xarelto 15 mg twice a day on 12/16  Thrombocytopenia--platelet count was noted to be 58 on admission, likely secondary to #1, today up to 73  Hypothyroidism--treated, free T4 1.34  Hyperlipidemia--treated with statin  Migraine headaches--treated  Anion gap metabolic acidosis--improved  Anxiety/depression--treated      Expected discharge date: Today, awaiting echo and ambulating in the hallway    Disposition:    [x] Home       [] TCU       [] Rehab       [] Psych       [] SNF       [] [facility]       [] Other-    Chief Complaint: Back pain and shortness of breath    Hospital Course: Per H&P done 12/16/2022: \"Blaire Villa is a 57 y.o. female with PMHx of hypothyroidism, hyperlipidemia, and migraines who presented to [facility] with chief complaint of left back pain and shortness of breath. Patient states she started having left posterior rib pain on the 12/12 (4 days ago) but thought she pulled a muscle. The pain continued to get worse over the following days. Reports OTC pain relievers helped some, but did not completely resolve the pain.  Yesterday she tried to make her bed and became short of breath. She states she had difficulty speaking in full sentences due to SOB. Patient states she had a lower appetite the last several days. Patient denies recent immobilization, HRT, smoking, and recent illness. She reports her daughter having a PE last year without known cause- her daughter was referred to a hematologist but patient is not aware if they found a cause. Denies fevers/chills, lightheadedness, chest pain, N/V, and abdominal pain. \"    12/17--> hemodynamically stable, did have an isolated heart rate of 112 at 345 this morning otherwise satting 97 to 100% on room air; awaiting echocardiogram    Subjective (past 24 hours): Complains of a frontal headache currently rates 7 out of 10, patient does get headaches at home and takes Imitrex, she also complains of some left-sided back pain rates 5 out of 10, she states overall she is feeling better, she denies shortness of breath, she denies any nausea; family members at bedside and feel very comfortable in her going home    Medications:  Reviewed    Infusion Medications    sodium chloride 75 mL/hr at 12/17/22 0217    sodium chloride       Scheduled Medications    pantoprazole  40 mg Oral Daily    sodium chloride flush  5-40 mL IntraVENous 2 times per day    rivaroxaban  15 mg Oral BID WC    levothyroxine  88 mcg Oral Daily    topiramate  100 mg Oral BID    citalopram  40 mg Oral Daily    atorvastatin  10 mg Oral Nightly     PRN Meds: zolpidem, sodium chloride flush, sodium chloride, potassium chloride **OR** potassium alternative oral replacement **OR** potassium chloride, ondansetron **OR** ondansetron, polyethylene glycol, bisacodyl, acetaminophen **OR** acetaminophen, magnesium sulfate      Intake/Output Summary (Last 24 hours) at 12/17/2022 2682  Last data filed at 12/16/2022 1513  Gross per 24 hour   Intake 480 ml   Output --   Net 480 ml       Diet:  ADULT DIET; Regular; 5 carb choices (75 gm/meal);  Low Fat/Low Chol/High Fiber/2 gm Na    Exam:  BP 99/66 Pulse (!) 112   Temp 98.4 °F (36.9 °C) (Oral)   Resp 16   Ht 5' 1\" (1.549 m)   Wt 124 lb 9 oz (56.5 kg)   SpO2 97%   BMI 23.54 kg/m²     General appearance: No apparent distress, appears stated age and cooperative. HEENT: Pupils equal, round, and reactive to light. Conjunctivae/corneas clear. Neck: Supple, with full range of motion. No jugular venous distention. Trachea midline. Respiratory:  Normal respiratory effort. Clear to auscultation, bilaterally without Rales/Wheezes/Rhonchi. Cardiovascular: Regular rate and rhythm with normal S1/S2 without murmurs, rubs or gallops. Abdomen: Soft, non-tender, non-distended with normal bowel sounds. Musculoskeletal: passive and active ROM x 4 extremities. Skin: Skin color, texture, turgor normal.    Neurologic:  Neurovascularly intact without any focal sensory/motor deficits. Cranial nerves: II-XII intact, grossly non-focal.  Psychiatric: Alert and oriented, thought content appropriate,  Capillary Refill: Brisk,< 3 seconds   Peripheral Pulses: +2 palpable, equal bilaterally       Labs:   Recent Labs     12/16/22 0337 12/17/22 0522   WBC 12.8* 11.9*   HGB 10.7* 9.4*   HCT 34.2* 29.1*   PLT 58* 73*     Recent Labs     12/16/22 0337 12/17/22 0522     --    K 3.6  --      --    CO2 18* 18*   BUN 11 10   CREATININE 0.8 0.6   CALCIUM 9.0 8.1*     Recent Labs     12/16/22 0337   AST 15   ALT 11   BILIDIR <0.2   BILITOT 0.3   ALKPHOS 69     Recent Labs     12/16/22  0758   INR 1.32*     Radiology:  CTA CHEST W WO CONTRAST    Result Date: 12/16/2022  * ADDENDUM #1 * This report was discussed with Miya Robertson RN on Dec 16, 2022 05:21:00 EST. This document has been electronically signed by: Troy Jefferson on 12/16/2022 05:21 AM * ORIGINAL REPORT * CTA chest. Comparison: None. Technique: Axial sections following intravenous contrast with multiplanar reformats and maximum intensity projections.  Findings: Large near-complete occlusive intraluminal filling defect within the distal left central pulmonary artery embolus just distal to the takeoff of the anterior left upper lobe segmental pulmonary artery. This extends to the segmental and multiple subsegmental pulmonary arteries supplying the apicoposterior segment of the left upper lobe. 2 proximal segmental and several subsegmental pulmonary emboli in the posterior medial left lower lobe. Segmental and subsegmental intraluminal filling defects within pulmonary arteries supplying the superior segment of the left lower lobe. Small ill-defined pulmonary infarction within the lateral left lung apex. Moderate pulmonary infarction within the apical segment of the left upper lobe. 2 small nonocclusive pulmonary emboli within proximal subsegmental pulmonary arteries in the right lower lobe supplying the posterior segment. No significant pleural effusion. Bibasilar subsegmental atelectasis. Right middle lobe segmental atelectasis. Minimal lingular subsegmental atelectasis. Mild right heart strain. No pericardial effusion. Mostly thrombosed splenic artery branch aneurysm just above the left adrenal gland measuring 8 mm. Several subcentimeter low density structures within the anterior liver, likely cysts or hemangiomas. Central through distal left upper lobe and left lower lobe pulmonary emboli with associated left upper lobe and left lower lobe pulmonary infarctions, and signs of right heart strain. Minor nonocclusive pulmonary emboli within 2 proximal subsegmental pulmonary arteries in the right lower lobe. Mostly thrombosed subcentimeter splenic artery aneurysm. This document has been electronically signed by: Americo Clement on 12/16/2022 05:17 AM All CTs at this facility use dose modulation techniques and iterative reconstructions, and/or weight-based dosing when appropriate to reduce radiation to a low as reasonably achievable. 3D Post-processing was performed on this study.     VL DUP LOWER EXTREMITY VENOUS BILATERAL    Result Date: 12/16/2022  PROCEDURE: VL DUP LOWER EXTREMITY VENOUS BILATERAL CLINICAL INFORMATION: PE. Shortness of breath. COMPARISON: No prior study. TECHNIQUE: Venous doppler ultrasound was performed of the bilateral lower extremities using gray scale, color flow and spectral doppler imaging. FINDINGS: There is normal color flow, spectral analysis and compressibility of the common femoral vein, femoral vein and popliteal vein bilaterally . There is normal color flow and compressibility in the posterior tibial veins, anterior tibial veins and peroneal veins. No evidence of deep venous thrombosis of the bilateral lower extremities. **This report has been created using voice recognition software. It may contain minor errors which are inherent in voice recognition technology. ** Final report electronically signed by Dr. Aditi Holloway MD on 12/16/2022 11:14 AM      DVT prophylaxis: [] Lovenox                                 [] SCDs                                 [] SQ Heparin                                 [x] Encourage ambulation           [x] Already on Anticoagulation~Xarelto     Code Status: Full Code    PT/OT Eval Status: Ambulate    Tele:   [x] Yes sinus rhythm heart rate 86             [] no    Active Hospital Problems    Diagnosis Date Noted    Pulmonary embolism without acute cor pulmonale, unspecified chronicity, unspecified pulmonary embolism type ([facility] [address] [I26.99] 12/16/2022     Priority: Medium       Electronically signed by JASVIR Rivas CNP on 12/17/2022 at 6:21 AM

## 2022-12-17 NOTE — FLOWSHEET NOTE
12/17/22 1128   Treatment Team Notification   Reason for Communication Patient/Family request   Team Member Name [address] LaurieTumri Team Role Attending Provider   Method of Communication Secure Message   Response See orders   Notification Time [id number]     Patient requesting Imatrex for headache. This RN gave Tylenol and an ice pack but still complaining of headache. New order placed for Imatrex.

## 2022-12-26 NOTE — PROGRESS NOTES
Physician Progress Note      PATIENT:               David Bonds  CSN #:                  [id number]  :                       1960  ADMIT DATE:       2022 3:10 AM  DISCH DATE:        2022 5:33 PM  RESPONDING  PROVIDER #:        Good Kat CNP          QUERY TEXT:    Ninfa Hdez,    Pt admitted with  pulmonary embolism (POA) with right heart strain documented. If possible, please document in progress notes and discharge summary if you   are evaluating and/or treating any of the following: The medical record reflects the following:  Risk Factors: SOB  Clinical Indicators: Per notes: Unprovoked pulmonary embolism (POA) with right   heart strain with moderate pulmonary infarction. CTA showed central through   distal EARL and LLL emboli with infarctions and signs of right heart stain. Treatment: heparin drip transitioned patient to Xarelto  Options provided:  -- Acute pulmonary embolism with Cor Pulmonale. -- Acute pulmonary embolism without Cor Pulmonale. -- Other - I will add my own diagnosis  -- Disagree - Not applicable / Not valid  -- Disagree - Clinically unable to determine / Unknown  -- Refer to Clinical Documentation Reviewer    PROVIDER RESPONSE TEXT:    Provider is clinically unable to determine a response to this query.     Query created by: Heather Ragland on 2022 1:09 PM      Electronically signed by:  Good Kat CNP 2022 1:07 PM

## 2025-04-02 ENCOUNTER — TRANSCRIBE ORDERS (OUTPATIENT)
Dept: ADMINISTRATIVE | Age: 65
End: 2025-04-02

## 2025-04-02 DIAGNOSIS — Z12.31 ENCOUNTER FOR SCREENING MAMMOGRAM FOR MALIGNANT NEOPLASM OF BREAST: Primary | ICD-10-CM

## (undated) DEVICE — SET ADMIN 25ML L117IN PMP MOD CK VLV RLER CLMP 2 SMRTSITE

## (undated) DEVICE — CONMED SCOPE SAVER BITE BLOCK, 20X27 MM: Brand: SCOPE SAVER

## (undated) DEVICE — ENDO KIT: Brand: MEDLINE INDUSTRIES, INC.

## (undated) DEVICE — IV START KIT: Brand: MEDLINE INDUSTRIES, INC.

## (undated) DEVICE — FORCEPS BX L240CM JAW DIA3.2MM L CAP W/ NDL MIC MESH TOOTH

## (undated) DEVICE — MEDI-VAC YANKAUER SUCTION HANDLE W/BULBOUS TIP: Brand: CARDINAL HEALTH

## (undated) DEVICE — CATHETER ETER IV L1IN OD22GA POLYUR PERIPH WNG HUB SFTY SHLD

## (undated) DEVICE — MEDI-VAC NON-CONDUCTIVE SUCTION TUBING 6MM X 6.1M (20 FT.) L: Brand: CARDINAL HEALTH

## (undated) DEVICE — SOLUTION IV 1000ML 0.45% SOD CHL PH 5 INJ USP VIAFLX PLAS

## (undated) DEVICE — TUBING IV STOPCOCK 48 CM 3 W